# Patient Record
Sex: FEMALE | Race: WHITE | NOT HISPANIC OR LATINO | Employment: UNEMPLOYED | URBAN - METROPOLITAN AREA
[De-identification: names, ages, dates, MRNs, and addresses within clinical notes are randomized per-mention and may not be internally consistent; named-entity substitution may affect disease eponyms.]

---

## 2017-10-18 ENCOUNTER — APPOINTMENT (OUTPATIENT)
Dept: LAB | Facility: HOSPITAL | Age: 47
End: 2017-10-18

## 2017-10-18 ENCOUNTER — OFFICE VISIT (OUTPATIENT)
Dept: NEUROLOGY | Facility: CLINIC | Age: 47
End: 2017-10-18

## 2017-10-18 VITALS
SYSTOLIC BLOOD PRESSURE: 118 MMHG | HEIGHT: 62 IN | WEIGHT: 88 LBS | DIASTOLIC BLOOD PRESSURE: 68 MMHG | BODY MASS INDEX: 16.2 KG/M2

## 2017-10-18 DIAGNOSIS — R56.9 SEIZURES (HCC): Primary | ICD-10-CM

## 2017-10-18 PROCEDURE — 36415 COLL VENOUS BLD VENIPUNCTURE: CPT | Performed by: PSYCHIATRY & NEUROLOGY

## 2017-10-18 PROCEDURE — 80177 DRUG SCRN QUAN LEVETIRACETAM: CPT | Performed by: PSYCHIATRY & NEUROLOGY

## 2017-10-18 PROCEDURE — 99244 OFF/OP CNSLTJ NEW/EST MOD 40: CPT | Performed by: PSYCHIATRY & NEUROLOGY

## 2017-10-18 RX ORDER — LEVETIRACETAM 1000 MG/1
TABLET ORAL
Refills: 0 | COMMUNITY
Start: 2017-10-12 | End: 2018-01-09

## 2017-10-18 RX ORDER — SERTRALINE HYDROCHLORIDE 100 MG/1
TABLET, FILM COATED ORAL
Refills: 0 | COMMUNITY
Start: 2017-10-12 | End: 2022-10-17 | Stop reason: HOSPADM

## 2017-10-18 NOTE — PROGRESS NOTES
Wilman Koch is a 47 y.o. female.     History of Present Illness     The following portions of the patient's history were reviewed today and updated as appropriate:  allergies, current medications, past family history, past medical history, past social history, past surgical history and problem list.      Chief complaint is seizure and the patient is seen today in consultation at the request of the referring health care provider  The time I spent with the patient today was used discussing the differential diagnosis, treatment and management options and prognosis. I addressed all of the patient's questions.  The patient's outside records reviewed by me today and in summary state at that she has a seizure disorder that is treated with Keppra 1000 g twice a day and she continues to have breakthrough seizures with generalized tonic-clonic episodes.  Patient was diagnosed with epilepsy in 2004. Her generalized tonic-clonic seizures were controlled with per 1000 g twice a day until recently when she started having more and she is now having 9 episodes in 3 weeks at that last between 10 and 15 minutes and it takes usually 2 days to get back to her baseline. She and will start passing out with her eyes opened and will stare and really at the person and then will start jerking with all 4 extremities she did not also will make a choking sound and then will come back around to consciousness      Review of Systems   Constitutional: Negative for appetite change, chills and fatigue.   HENT: Negative for congestion, ear pain, facial swelling and sinus pressure.    Eyes: Negative for pain and redness.   Respiratory: Negative for shortness of breath.    Cardiovascular: Negative for chest pain.   Gastrointestinal: Negative for abdominal pain.   Endocrine: Negative for cold intolerance and heat intolerance.   Genitourinary: Negative for dysuria.   Musculoskeletal: Negative for arthralgias.   Skin: Negative for rash.  "  Allergic/Immunologic: Negative for immunocompromised state.   Neurological: Positive for seizures.   Hematological: Negative for adenopathy.   Psychiatric/Behavioral: Negative for hallucinations.         Objective      height is 61.81\" (157 cm) and weight is 88 lb (39.9 kg). Her blood pressure is 118/68.     The patient's general appearance was normal today  Carotid pulses were palpable bilaterally  The ophthalmological exam showed the refractory media clear, no blurring of disc margins, there were no hemorrhages noted, blood vessels appeared normal.      Neurologic Exam     Mental Status   Oriented to person.   Oriented to place. Oriented to country, city, area and street.   Oriented to time. Oriented to year, month, date, day and season.   Registration: recalls 3 of 3 objects. Recall at 5 minutes: recalls 3 of 3 objects. Follows 3 step commands.   Attention: normal.   Speech: speech is normal   Level of consciousness: alert  Knowledge: consistent with education.   Able to name object. Able to read. Able to repeat. Able to write. Normal comprehension.     Cranial Nerves     CN II   Visual fields full to confrontation.     CN III, IV, VI   Right pupil: Shape: regular. Consensual response: intact. Accommodation: intact.   Left pupil: Shape: regular. Consensual response: intact.   CN III: no CN III palsy  CN VI: no CN VI palsy  Nystagmus: none   Diplopia: none  Ophthalmoparesis: none  Upgaze: normal  Downgaze: normal  Conjugate gaze: present  Vestibulo-ocular reflex: present    CN V   Facial sensation intact.     CN VII   Facial expression full, symmetric.     CN VIII   CN VIII normal.     CN IX, X   CN IX normal.     CN XI   CN XI normal.     CN XII   CN XII normal.     Motor Exam   Muscle bulk: normal  Overall muscle tone: normal  Right arm pronator drift: absent  Left arm pronator drift: absent    Strength   Strength 5/5 except as noted.     Sensory Exam   Light touch normal.     Gait, Coordination, and Reflexes " "    Gait  Gait: normal    Coordination   Romberg: negative  Finger to nose coordination: normal  Heel to shin coordination: normal  Tandem walking coordination: normal    Tremor   Resting tremor: absent  Intention tremor: absent  Action tremor: absent    Reflexes   Reflexes 2+ except as noted.       Assessment/Plan     Lucinda was seen today for seizures.    Diagnoses and all orders for this visit:    Seizures  -     Ambulatory Referral to Neurology  -     Levetiracetam Level (Keppra)          Discussion/Summary:      The patient has an unusual presentation of seizures at this point with higher frequency and her eyes open where she seems to look at people during the episodes. Therefore we will schedule her with Dr. Lewis for video EEG evaluation to see if all of these are epileptic episodes and in the meantime she is to increase the dose to 1500 mg twice a day and we will get a blood level today.  She is not allowed to work until she sees Dr. Lewis for evaluation because of the complexity of the situation is beyond the scope of my practice and then she would have to follow up with him. With regards to having by law she is not allowed to drive until 90 days from the last seizure episode. I did not schedule a follow-up appointment              Disclaimer: This letter was created using dragon voice recognition software.  This voice recognition software may create errors that may go undetected and can impact the meaning of a sentence or paragraph.  The most frequent error is that the software misidentifies \"he\" and \"she\". However, contextual reading is often able to identify this as a voice recognotion error.  Another frequent error is that the software misidentifies \"the patient\" as \"\"          "

## 2017-10-20 LAB — LEVETIRACETAM SERPL-MCNC: 57.2 UG/ML (ref 10–40)

## 2018-01-09 ENCOUNTER — OFFICE VISIT (OUTPATIENT)
Dept: NEUROLOGY | Facility: CLINIC | Age: 48
End: 2018-01-09

## 2018-01-09 DIAGNOSIS — R56.9 CONVULSIONS, UNSPECIFIED CONVULSION TYPE (HCC): Primary | ICD-10-CM

## 2018-01-09 PROCEDURE — 99215 OFFICE O/P EST HI 40 MIN: CPT | Performed by: PHYSICIAN ASSISTANT

## 2018-01-09 RX ORDER — HYDROXYZINE PAMOATE 25 MG/1
CAPSULE ORAL
Refills: 0 | COMMUNITY
Start: 2017-12-09 | End: 2022-11-09

## 2018-01-09 NOTE — PROGRESS NOTES
Subjective     Chief Complaint: spells      History of Present Illness   Lucinda Koch is a 47 y.o. female who returns to clinic today for episodes of convulsions. She was previously followed by Dr. Jeronimo. She has noted spells since 2004 during which she 'passes out' with her eyes open and has generalized convulsions. She may have associated loss of consciousness though her family notes that she is typically able to respond to questions during these episodes. These spells typically last 10-15 minutes and may occur several times a week. Afterwards, she notes fatigue and confusion. She denies any associated urinary incontinence and tongue biting. Her family notes that these spells are typically worse with increased stress as well as when she is around a large group of people.     She was previously on Keppra 1500mg BID, which was not beneficial. She had a VEEG performed by Dr. 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 At this time, her Keppra was discontinued. She has not noted in difference in symptoms since the discontinuation of this medication. She was recently started on Zoloft, which has been beneficial.       I have reviewed and confirmed the past family, social and medical history as accurate on 1/9/18.     Review of Systems   Constitutional: Negative.    HENT: Negative.    Eyes: Negative.    Respiratory: Negative.    Cardiovascular: Negative.    Gastrointestinal: Negative.    Endocrine: Negative.    Genitourinary: Negative.    Musculoskeletal: Negative.    Skin: Negative.    Allergic/Immunologic: Negative.    Neurological:        As noted in HPI   Hematological: Negative.    Psychiatric/Behavioral: The patient is nervous/anxious.         As noted in HPI       Objective     There were no vitals taken for this visit.    General appearance today is normal.     Physical Exam   Neurological: She has normal  strength. She has a normal Finger-Nose-Finger Test.   Psychiatric: Her speech is normal.        Neurologic Exam     Mental Status   Speech: speech is normal   Level of consciousness: alert  Normal comprehension.     Cranial Nerves   Cranial nerves II through XII intact.     Motor Exam   Muscle bulk: normal  Overall muscle tone: normal    Strength   Strength 5/5 throughout.     Sensory Exam   Light touch normal.     Gait, Coordination, and Reflexes     Coordination   Finger to nose coordination: normal    Tremor   Resting tremor: absent          Assessment/Plan   Lucinda was seen today for seizures.    Diagnoses and all orders for this visit:    Convulsions, unspecified convulsion type          Discussion/Summary   Lucinda Koch returns to clinic today for convulsive spells. 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 This was discussed at length with the patient and her family. Her workup has been complete and appropriate. Therefore, I do not have any additional recommendations concerning this. I recommended that she follow with psychiatry. She will then follow up in our clinic on an as needed basis.       I spent 30 minutes out of 40 minutes face to face with the patient and family and discussing diagnosis, prognosis, diagnostic testing, evaluation, current status, treatment options and management.    As part of this visit I reviewed radiology results and obtained additional history from the family which is incorporated in the HPI.      Tia Prieto PA-C

## 2022-10-15 ENCOUNTER — HOSPITAL ENCOUNTER (INPATIENT)
Facility: HOSPITAL | Age: 52
LOS: 2 days | Discharge: HOME | End: 2022-10-17
Attending: EMERGENCY MEDICINE | Admitting: HOSPITALIST

## 2022-10-15 ENCOUNTER — APPOINTMENT (OUTPATIENT)
Dept: CT IMAGING | Facility: HOSPITAL | Age: 52
End: 2022-10-15

## 2022-10-15 DIAGNOSIS — R11.10 INTRACTABLE VOMITING: Primary | ICD-10-CM

## 2022-10-15 DIAGNOSIS — E87.6 HYPOKALEMIA: ICD-10-CM

## 2022-10-15 PROBLEM — E43 SEVERE MALNUTRITION (HCC): Status: ACTIVE | Noted: 2022-10-15

## 2022-10-15 LAB
ALBUMIN SERPL-MCNC: 3 G/DL (ref 3.5–5.2)
ALBUMIN/GLOB SERPL: 1.4 G/DL
ALP SERPL-CCNC: 71 U/L (ref 39–117)
ALT SERPL W P-5'-P-CCNC: 36 U/L (ref 1–33)
AMORPH URATE CRY URNS QL MICRO: ABNORMAL /HPF
ANION GAP SERPL CALCULATED.3IONS-SCNC: 12 MMOL/L (ref 5–15)
AST SERPL-CCNC: 49 U/L (ref 1–32)
BACTERIA UR QL AUTO: ABNORMAL /HPF
BASOPHILS # BLD AUTO: 0.01 10*3/MM3 (ref 0–0.2)
BASOPHILS NFR BLD AUTO: 0.1 % (ref 0–1.5)
BILIRUB SERPL-MCNC: 0.5 MG/DL (ref 0–1.2)
BILIRUB UR QL STRIP: NEGATIVE
BUN SERPL-MCNC: 6 MG/DL (ref 6–20)
BUN/CREAT SERPL: 11.3 (ref 7–25)
CALCIUM SPEC-SCNC: 8.7 MG/DL (ref 8.6–10.5)
CHLORIDE SERPL-SCNC: 91 MMOL/L (ref 98–107)
CLARITY UR: ABNORMAL
CO2 SERPL-SCNC: 30 MMOL/L (ref 22–29)
COD CRY URNS QL: ABNORMAL /HPF
COLOR UR: YELLOW
CREAT SERPL-MCNC: 0.53 MG/DL (ref 0.57–1)
D-LACTATE SERPL-SCNC: 1.8 MMOL/L (ref 0.5–2)
DEPRECATED RDW RBC AUTO: 46 FL (ref 37–54)
EGFRCR SERPLBLD CKD-EPI 2021: 112.1 ML/MIN/1.73
EOSINOPHIL # BLD AUTO: 0.02 10*3/MM3 (ref 0–0.4)
EOSINOPHIL NFR BLD AUTO: 0.3 % (ref 0.3–6.2)
ERYTHROCYTE [DISTWIDTH] IN BLOOD BY AUTOMATED COUNT: 13.5 % (ref 12.3–15.4)
FOLATE SERPL-MCNC: 16.6 NG/ML (ref 4.78–24.2)
GLOBULIN UR ELPH-MCNC: 2.1 GM/DL
GLUCOSE SERPL-MCNC: 104 MG/DL (ref 65–99)
GLUCOSE UR STRIP-MCNC: NEGATIVE MG/DL
GRAN CASTS URNS QL MICRO: ABNORMAL /LPF
HCT VFR BLD AUTO: 40 % (ref 34–46.6)
HGB BLD-MCNC: 14.2 G/DL (ref 12–15.9)
HGB UR QL STRIP.AUTO: NEGATIVE
HOLD SPECIMEN: NORMAL
HOLD SPECIMEN: NORMAL
HYALINE CASTS UR QL AUTO: ABNORMAL /LPF
IMM GRANULOCYTES # BLD AUTO: 0.04 10*3/MM3 (ref 0–0.05)
IMM GRANULOCYTES NFR BLD AUTO: 0.5 % (ref 0–0.5)
KETONES UR QL STRIP: ABNORMAL
LEUKOCYTE ESTERASE UR QL STRIP.AUTO: NEGATIVE
LIPASE SERPL-CCNC: 14 U/L (ref 13–60)
LYMPHOCYTES # BLD AUTO: 1.51 10*3/MM3 (ref 0.7–3.1)
LYMPHOCYTES NFR BLD AUTO: 19.8 % (ref 19.6–45.3)
MAGNESIUM SERPL-MCNC: 1.3 MG/DL (ref 1.6–2.6)
MCH RBC QN AUTO: 32.7 PG (ref 26.6–33)
MCHC RBC AUTO-ENTMCNC: 35.5 G/DL (ref 31.5–35.7)
MCV RBC AUTO: 92.2 FL (ref 79–97)
MONOCYTES # BLD AUTO: 0.57 10*3/MM3 (ref 0.1–0.9)
MONOCYTES NFR BLD AUTO: 7.5 % (ref 5–12)
MUCOUS THREADS URNS QL MICRO: ABNORMAL /HPF
NEUTROPHILS NFR BLD AUTO: 5.49 10*3/MM3 (ref 1.7–7)
NEUTROPHILS NFR BLD AUTO: 71.8 % (ref 42.7–76)
NITRITE UR QL STRIP: NEGATIVE
NRBC BLD AUTO-RTO: 0 /100 WBC (ref 0–0.2)
PH UR STRIP.AUTO: 6.5 [PH] (ref 5–8)
PLATELET # BLD AUTO: 273 10*3/MM3 (ref 140–450)
PMV BLD AUTO: 9.6 FL (ref 6–12)
POTASSIUM SERPL-SCNC: 2.5 MMOL/L (ref 3.5–5.2)
PROT SERPL-MCNC: 5.1 G/DL (ref 6–8.5)
PROT UR QL STRIP: NEGATIVE
RBC # BLD AUTO: 4.34 10*6/MM3 (ref 3.77–5.28)
RBC # UR STRIP: ABNORMAL /HPF
REF LAB TEST METHOD: ABNORMAL
SODIUM SERPL-SCNC: 133 MMOL/L (ref 136–145)
SP GR UR STRIP: 1.01 (ref 1–1.03)
SQUAMOUS #/AREA URNS HPF: ABNORMAL /HPF
TSH SERPL DL<=0.05 MIU/L-ACNC: 1.57 UIU/ML (ref 0.27–4.2)
UROBILINOGEN UR QL STRIP: ABNORMAL
VIT B12 BLD-MCNC: 335 PG/ML (ref 211–946)
WBC # UR STRIP: ABNORMAL /HPF
WBC NRBC COR # BLD: 7.64 10*3/MM3 (ref 3.4–10.8)
WHOLE BLOOD HOLD COAG: NORMAL
WHOLE BLOOD HOLD SPECIMEN: NORMAL

## 2022-10-15 PROCEDURE — 81001 URINALYSIS AUTO W/SCOPE: CPT | Performed by: EMERGENCY MEDICINE

## 2022-10-15 PROCEDURE — 82607 VITAMIN B-12: CPT | Performed by: HOSPITALIST

## 2022-10-15 PROCEDURE — 25010000002 MAGNESIUM SULFATE 2 GM/50ML SOLUTION: Performed by: HOSPITALIST

## 2022-10-15 PROCEDURE — 0 POTASSIUM CHLORIDE 10 MEQ/100ML SOLUTION: Performed by: HOSPITALIST

## 2022-10-15 PROCEDURE — 74177 CT ABD & PELVIS W/CONTRAST: CPT

## 2022-10-15 PROCEDURE — 99223 1ST HOSP IP/OBS HIGH 75: CPT | Performed by: HOSPITALIST

## 2022-10-15 PROCEDURE — 83605 ASSAY OF LACTIC ACID: CPT | Performed by: EMERGENCY MEDICINE

## 2022-10-15 PROCEDURE — 99284 EMERGENCY DEPT VISIT MOD MDM: CPT

## 2022-10-15 PROCEDURE — 83690 ASSAY OF LIPASE: CPT | Performed by: EMERGENCY MEDICINE

## 2022-10-15 PROCEDURE — 25010000002 IOPAMIDOL 61 % SOLUTION: Performed by: EMERGENCY MEDICINE

## 2022-10-15 PROCEDURE — 82746 ASSAY OF FOLIC ACID SERUM: CPT | Performed by: HOSPITALIST

## 2022-10-15 PROCEDURE — 99222 1ST HOSP IP/OBS MODERATE 55: CPT | Performed by: INTERNAL MEDICINE

## 2022-10-15 PROCEDURE — 25010000002 ONDANSETRON PER 1 MG: Performed by: NURSE PRACTITIONER

## 2022-10-15 PROCEDURE — 83735 ASSAY OF MAGNESIUM: CPT | Performed by: NURSE PRACTITIONER

## 2022-10-15 PROCEDURE — 80050 GENERAL HEALTH PANEL: CPT | Performed by: EMERGENCY MEDICINE

## 2022-10-15 PROCEDURE — 36415 COLL VENOUS BLD VENIPUNCTURE: CPT

## 2022-10-15 RX ORDER — ONDANSETRON 2 MG/ML
4 INJECTION INTRAMUSCULAR; INTRAVENOUS EVERY 6 HOURS PRN
Status: DISCONTINUED | OUTPATIENT
Start: 2022-10-15 | End: 2022-10-17 | Stop reason: HOSPADM

## 2022-10-15 RX ORDER — MAGNESIUM SULFATE HEPTAHYDRATE 40 MG/ML
4 INJECTION, SOLUTION INTRAVENOUS AS NEEDED
Status: DISCONTINUED | OUTPATIENT
Start: 2022-10-15 | End: 2022-10-17 | Stop reason: HOSPADM

## 2022-10-15 RX ORDER — MAGNESIUM SULFATE HEPTAHYDRATE 40 MG/ML
2 INJECTION, SOLUTION INTRAVENOUS AS NEEDED
Status: DISCONTINUED | OUTPATIENT
Start: 2022-10-15 | End: 2022-10-17 | Stop reason: HOSPADM

## 2022-10-15 RX ORDER — PROCHLORPERAZINE EDISYLATE 5 MG/ML
5 INJECTION INTRAMUSCULAR; INTRAVENOUS EVERY 6 HOURS PRN
Status: DISCONTINUED | OUTPATIENT
Start: 2022-10-15 | End: 2022-10-17 | Stop reason: HOSPADM

## 2022-10-15 RX ORDER — PROCHLORPERAZINE MALEATE 5 MG/1
5 TABLET ORAL EVERY 6 HOURS PRN
Status: DISCONTINUED | OUTPATIENT
Start: 2022-10-15 | End: 2022-10-17 | Stop reason: HOSPADM

## 2022-10-15 RX ORDER — DEXTROSE, SODIUM CHLORIDE, AND POTASSIUM CHLORIDE 5; .9; .15 G/100ML; G/100ML; G/100ML
75 INJECTION INTRAVENOUS CONTINUOUS
Status: DISCONTINUED | OUTPATIENT
Start: 2022-10-15 | End: 2022-10-17 | Stop reason: HOSPADM

## 2022-10-15 RX ORDER — DIPHENOXYLATE HYDROCHLORIDE AND ATROPINE SULFATE 2.5; .025 MG/1; MG/1
1 TABLET ORAL DAILY
Status: DISCONTINUED | OUTPATIENT
Start: 2022-10-15 | End: 2022-10-17 | Stop reason: HOSPADM

## 2022-10-15 RX ORDER — PANTOPRAZOLE SODIUM 40 MG/1
40 TABLET, DELAYED RELEASE ORAL
Status: DISCONTINUED | OUTPATIENT
Start: 2022-10-16 | End: 2022-10-17 | Stop reason: HOSPADM

## 2022-10-15 RX ORDER — POTASSIUM CHLORIDE 1.5 G/1.77G
40 POWDER, FOR SOLUTION ORAL AS NEEDED
Status: DISCONTINUED | OUTPATIENT
Start: 2022-10-15 | End: 2022-10-17 | Stop reason: HOSPADM

## 2022-10-15 RX ORDER — SODIUM CHLORIDE 9 MG/ML
10 INJECTION INTRAVENOUS AS NEEDED
Status: DISCONTINUED | OUTPATIENT
Start: 2022-10-15 | End: 2022-10-17 | Stop reason: HOSPADM

## 2022-10-15 RX ORDER — ONDANSETRON 2 MG/ML
4 INJECTION INTRAMUSCULAR; INTRAVENOUS ONCE
Status: COMPLETED | OUTPATIENT
Start: 2022-10-15 | End: 2022-10-15

## 2022-10-15 RX ORDER — POTASSIUM CHLORIDE 7.45 MG/ML
10 INJECTION INTRAVENOUS
Status: DISCONTINUED | OUTPATIENT
Start: 2022-10-15 | End: 2022-10-17 | Stop reason: HOSPADM

## 2022-10-15 RX ORDER — SODIUM CHLORIDE 0.9 % (FLUSH) 0.9 %
10 SYRINGE (ML) INJECTION AS NEEDED
Status: DISCONTINUED | OUTPATIENT
Start: 2022-10-15 | End: 2022-10-17 | Stop reason: HOSPADM

## 2022-10-15 RX ORDER — TOPIRAMATE 100 MG/1
100 TABLET, FILM COATED ORAL 2 TIMES DAILY
Status: DISCONTINUED | OUTPATIENT
Start: 2022-10-15 | End: 2022-10-17 | Stop reason: HOSPADM

## 2022-10-15 RX ORDER — PROCHLORPERAZINE 25 MG
25 SUPPOSITORY, RECTAL RECTAL EVERY 12 HOURS PRN
Status: DISCONTINUED | OUTPATIENT
Start: 2022-10-15 | End: 2022-10-17 | Stop reason: HOSPADM

## 2022-10-15 RX ORDER — POTASSIUM CHLORIDE 750 MG/1
40 CAPSULE, EXTENDED RELEASE ORAL AS NEEDED
Status: DISCONTINUED | OUTPATIENT
Start: 2022-10-15 | End: 2022-10-17 | Stop reason: HOSPADM

## 2022-10-15 RX ORDER — TOPIRAMATE 100 MG/1
100 TABLET, FILM COATED ORAL 2 TIMES DAILY
COMMUNITY
End: 2022-11-09

## 2022-10-15 RX ORDER — FLUOXETINE 10 MG/1
10 CAPSULE ORAL DAILY
Status: DISCONTINUED | OUTPATIENT
Start: 2022-10-15 | End: 2022-10-17 | Stop reason: HOSPADM

## 2022-10-15 RX ORDER — FLUOXETINE 10 MG/1
10 CAPSULE ORAL DAILY
COMMUNITY

## 2022-10-15 RX ORDER — LEVETIRACETAM 750 MG/1
750 TABLET ORAL 2 TIMES DAILY
COMMUNITY

## 2022-10-15 RX ORDER — SODIUM CHLORIDE 0.9 % (FLUSH) 0.9 %
10 SYRINGE (ML) INJECTION EVERY 12 HOURS SCHEDULED
Status: DISCONTINUED | OUTPATIENT
Start: 2022-10-15 | End: 2022-10-17 | Stop reason: HOSPADM

## 2022-10-15 RX ADMIN — LEVETIRACETAM 750 MG: 250 TABLET, FILM COATED ORAL at 20:22

## 2022-10-15 RX ADMIN — POTASSIUM CHLORIDE 10 MEQ: 7.46 INJECTION, SOLUTION INTRAVENOUS at 15:09

## 2022-10-15 RX ADMIN — Medication 10 ML: at 20:23

## 2022-10-15 RX ADMIN — POTASSIUM CHLORIDE 10 MEQ: 7.46 INJECTION, SOLUTION INTRAVENOUS at 17:39

## 2022-10-15 RX ADMIN — MULTIVITAMIN TABLET 1 TABLET: TABLET at 15:09

## 2022-10-15 RX ADMIN — ONDANSETRON 4 MG: 2 INJECTION INTRAMUSCULAR; INTRAVENOUS at 10:10

## 2022-10-15 RX ADMIN — IOPAMIDOL 100 ML: 612 INJECTION, SOLUTION INTRAVENOUS at 10:25

## 2022-10-15 RX ADMIN — MAGNESIUM SULFATE HEPTAHYDRATE 2 G: 2 INJECTION, SOLUTION INTRAVENOUS at 15:17

## 2022-10-15 RX ADMIN — MAGNESIUM SULFATE HEPTAHYDRATE 2 G: 2 INJECTION, SOLUTION INTRAVENOUS at 17:26

## 2022-10-15 RX ADMIN — SODIUM CHLORIDE 1000 ML: 9 INJECTION, SOLUTION INTRAVENOUS at 10:10

## 2022-10-15 RX ADMIN — POTASSIUM CHLORIDE 10 MEQ: 7.46 INJECTION, SOLUTION INTRAVENOUS at 13:50

## 2022-10-15 RX ADMIN — POTASSIUM CHLORIDE 10 MEQ: 7.46 INJECTION, SOLUTION INTRAVENOUS at 16:35

## 2022-10-15 RX ADMIN — DEXTROSE MONOHYDRATE, SODIUM CHLORIDE, AND POTASSIUM CHLORIDE 75 ML/HR: 50; 9; 1.49 INJECTION, SOLUTION INTRAVENOUS at 15:14

## 2022-10-15 RX ADMIN — POTASSIUM CHLORIDE 10 MEQ: 7.46 INJECTION, SOLUTION INTRAVENOUS at 20:22

## 2022-10-15 RX ADMIN — MAGNESIUM SULFATE HEPTAHYDRATE 2 G: 2 INJECTION, SOLUTION INTRAVENOUS at 13:51

## 2022-10-15 RX ADMIN — POTASSIUM CHLORIDE 10 MEQ: 7.46 INJECTION, SOLUTION INTRAVENOUS at 18:44

## 2022-10-15 NOTE — CONSULTS
[facility]ology Consult    Referring Provider: Kevin Carroll MD    PCP: Jese Tran MD    Reason for Consultation: Intractable N/V    Chief complaint: nausea and vomiting.    History of present illness:    Lucinda Koch is a 52 y.o. female who is admitted with 6-month history of nausea and vomiting.  There is associated 40 pound weight loss.  Patient typically vomits after meals, but can vomit when not eating as well.  Denies hematemesis.   She was recently admitted to [facility] without finding an etiology for her symptoms.  Specifically, she had a negative work-up for biliary disease.  Reglan made her symptoms worse.  Zofran made her symptoms worse.  She is scheduled for an open access colonoscopy in [place] on October 20, 2022.  In the emergency room, CT scan of the abdomen is unremarkable.  She has mild transaminase elevation and multiple electrolyte abnormalities.  Denies history of anorexia/bulimia.  She does frequently take ibuprofen.    Allergies:  Aspirin    Scheduled Meds:  FLUoxetine, 10 mg, Oral, Daily  levETIRAcetam, 750 mg, Oral, BID  multivitamin, 1 tablet, Oral, Daily  [START ON 10/16/2022] pantoprazole, 40 mg, Oral, Q AM  sodium chloride, 10 mL, Intravenous, Q12H  topiramate, 100 mg, Oral, BID         Infusions:  dextrose 5 % and sodium chloride 0.9 % with KCl 20 mEq, 75 mL/hr, Last Rate: 75 mL/hr (10/15/22 1514)        PRN Meds:  •  magnesium sulfate **OR** magnesium sulfate **OR** magnesium sulfate  •  ondansetron  •  potassium chloride **OR** potassium chloride **OR** potassium chloride  •  prochlorperazine **OR** prochlorperazine **OR** prochlorperazine  •  Sodium Chloride (PF)  •  sodium chloride    Home Meds:  Medications Prior to Admission   Medication Sig Dispense Refill Last Dose   • FLUoxetine (PROzac) 10 MG capsule Take 1 capsule by mouth Daily.      • hydrOXYzine (VISTARIL) 25 MG capsule take 1 capsule by mouth every 6 hours if needed for anxiety  0     • levETIRAcetam (KEPPRA) 750 MG tablet Take 1 tablet by mouth 2 (Two) Times a Day.      • sertraline (ZOLOFT) 100 MG tablet take 1 tablet by mouth once daily  0    • topiramate (TOPAMAX) 100 MG tablet Take 1 tablet by mouth 2 (Two) Times a Day.          ROS: Review of Systems   Constitutional: Positive for appetite change and unexpected weight change. Negative for activity change, chills, fatigue and fever.   HENT: Negative.  Negative for mouth sores, nosebleeds and trouble swallowing.    Eyes: Negative.    Respiratory: Negative.  Negative for cough, chest tightness, shortness of breath, wheezing and stridor.    Cardiovascular: Negative.  Negative for chest pain and leg swelling.   Gastrointestinal: Positive for constipation, nausea and vomiting. Negative for abdominal distention, abdominal pain, blood in stool and diarrhea.   Endocrine: Negative.    Genitourinary: Negative.  Negative for difficulty urinating and dyspareunia.   Musculoskeletal: Negative.    Skin: Negative.    Allergic/Immunologic: Negative.    Neurological: Positive for seizures. Negative for tremors, syncope, weakness and light-headedness.   Hematological: Negative.  Negative for adenopathy. Does not bruise/bleed easily.   Psychiatric/Behavioral: Negative.  Negative for agitation and behavioral problems.       PAST MED HX:  Past Medical History:   Diagnosis Date   • Depression    • Migraine    • Seizures (HCC)        PAST SURG HX:  History reviewed. No pertinent surgical history.    FAM HX:  Family History   Problem Relation Age of Onset   • Heart disease Mother    • Hypertension Mother    • Heart disease Father    • Hypertension Father        SOC HX:  Social History     Socioeconomic History   • Marital status: Single   Tobacco Use   • Smoking status: Every Day     Packs/day: 2.00     Types: Cigarettes   • Smokeless tobacco: Never   Substance and Sexual Activity   • Alcohol use: No   • Drug use: Defer   • Sexual activity: Defer       PHYSICAL  "EXAM  /94 (BP Location: Left arm, Patient Position: Lying)   Pulse 93   Temp 98.9 °F (37.2 °C) (Oral)   Resp 16   Ht 157.5 cm (62\")   Wt 36.3 kg (80 lb)   SpO2 97%   BMI 14.63 kg/m²   Wt Readings from Last 3 Encounters:   10/15/22 36.3 kg (80 lb)   10/18/17 39.9 kg (88 lb)   ,body mass index is 14.63 kg/m².  Physical Exam  Vitals and nursing note reviewed.   Constitutional:       General: She is not in acute distress.     Appearance: She is ill-appearing. She is not toxic-appearing or diaphoretic.      Comments: Appears chronically ill and underweight.   HENT:      Head: Normocephalic.      Nose: Nose normal. No congestion.      Mouth/Throat:      Mouth: Mucous membranes are moist.      Pharynx: No oropharyngeal exudate.   Eyes:      General: No scleral icterus.     Conjunctiva/sclera: Conjunctivae normal.   Cardiovascular:      Rate and Rhythm: Normal rate.      Pulses: Normal pulses.   Pulmonary:      Effort: Pulmonary effort is normal. No respiratory distress.      Breath sounds: No stridor. No wheezing or rales.   Abdominal:      General: Bowel sounds are normal. There is no distension.      Palpations: Abdomen is soft.      Tenderness: There is no abdominal tenderness. There is no guarding.   Genitourinary:     Comments: Deferred  Musculoskeletal:      Cervical back: Normal range of motion.      Right lower leg: No edema.      Left lower leg: No edema.   Skin:     General: Skin is warm and dry.      Capillary Refill: Capillary refill takes less than 2 seconds.      Coloration: Skin is not jaundiced or pale.      Findings: No erythema or rash.   Neurological:      General: No focal deficit present.      Mental Status: She is alert and oriented to person, place, and time.   Psychiatric:         Mood and Affect: Mood normal.         Behavior: Behavior normal.       Results Review:   I reviewed the patient's new clinical results.    Lab Results   Component Value Date    WBC 7.64 10/15/2022    HGB 14.2 " 10/15/2022    HGB 13.9 01/07/2020    HCT 40.0 10/15/2022    MCV 92.2 10/15/2022     10/15/2022       No results found for: INR    Lab Results   Component Value Date    GLUCOSE 104 (H) 10/15/2022    BUN 6 10/15/2022    CREATININE 0.53 (L) 10/15/2022    BCR 11.3 10/15/2022     (L) 10/15/2022    K 2.5 (C) 10/15/2022    CO2 30.0 (H) 10/15/2022    CALCIUM 8.7 10/15/2022    ALBUMIN 3.00 (L) 10/15/2022    ALKPHOS 71 10/15/2022    BILITOT 0.5 10/15/2022    ALT 36 (H) 10/15/2022    AST 49 (H) 10/15/2022       ASSESSMENTS/PLANS    1.  Chronic severe nausea and vomiting.  At risk for SMA syndrome.  At risk for peptic ulcer disease (NSAID use).  2.  Hyponatremic, hypokalemic, metabolic alkalosis consistent with significant vomiting.  3.  Weight loss.  4.  Severe protein calorie malnutrition and underweight.  5.  Borderline elevated transaminases and focal fatty liver on CT.  Suspect related to severe malnutrition.  6.  Seizure disorder.  Patient reports good control of seizures on [facility] since 2004.    >> Plan EGD when electrolytes corrected, tentatively on Monday, 10/17/2022.  >> At risk for refeeding syndrome.  >> Recommend discontinue Topamax and find an alternative for migraine prophylaxis if needed, as Topamax is associated with nausea and weight loss.    I discussed the patient's findings and my recommendations with patient.    Mark I. Brunner, MD  10/15/22  16:00 EDT

## 2022-10-15 NOTE — H&P
[facility] Medicine Services  HISTORY AND PHYSICAL    Patient Name: Lucinda Koch  : 1970  MRN: [id number]  Primary Care Physician: Jese Tran MD  Date of admission: 10/15/2022      Subjective   Subjective     Chief Complaint:  Nausea and vomiting    HPI:  Lucinda Koch is a 52 y.o. female here with nausea/vomiting/weight loss x 6 months. Six months ago she noted progressive nausea with near daily vomiting, sometimes 2-3x daily. Sometimes associated with food but not always. Notes abdominal cramping with emesis otherwise no pain. Denies diarrhea. Denies blood in stool or hematemesis. Occasionally describes bilious vomiting. No f/c/sweats. Notes occasional globus sensation when swallowing, but otherwise no difficulty. Notes 40 pound weight loss. Has been seen in multiple EDs without diagnosis. Has tired zofran and reglan and these exacerbated her symptoms. She reports being told that her gallbladder was normal       Review of Systems   Constitutional: Positive for activity change, appetite change and fatigue.   HENT: Negative.    Respiratory: Negative.    Cardiovascular: Negative.    Gastrointestinal: Positive for nausea and vomiting.        Heartburn/reflux   Endocrine: Negative.    Genitourinary: Negative.    Musculoskeletal: Negative.    Skin: Positive for rash.   Neurological: Positive for weakness and numbness.        All other systems reviewed and are negative.     Personal History     Past Medical History:   Diagnosis Date   • Depression    • Migraine    • Seizures (HCC)              History reviewed. No pertinent surgical history.    Family History: Her family history includes Heart disease in her father and mother; Hypertension in her father and mother. Otherwise pertinent FHx was reviewed and unremarkable.     Social History:  reports that she has been smoking cigarettes. She has been smoking an average of 2 packs per day. She has never used smokeless  tobacco. Drug use questions deferred to the physician. She reports that she does not drink alcohol.  Social History     Social History Narrative   • Not on file       Medications:  Available home medication information reviewed.  (Not in a hospital admission)      Allergies   Allergen Reactions   • Aspirin        Objective   Objective     Vital Signs:   Temp:  [98.4 °F (36.9 °C)] 98.4 °F (36.9 °C)  Heart Rate:  [] 92  Resp:  [16] 16  BP: (126-140)/() 140/96       Physical Exam   NAD, alert and oriented  OP clear, MMM  Neck supple  No LAD  RRR  CTAB  +BS, ND, NT, soft  HARRELL  Normal affect  No c/c/e    Result Review:  I have personally reviewed the results from the time of this admission to 10/15/2022 12:58 EDT and agree with these findings:  []  Laboratory list / accordion  []  Microbiology  []  Radiology  []  EKG/Telemetry   []  Cardiology/Vascular   []  Pathology  []  Old records  []  Other:  Most notable findings include: CT reviewed, low K, low protein        LAB RESULTS:      Lab 10/15/22  1000   WBC 7.64   HEMOGLOBIN 14.2   HEMATOCRIT 40.0   PLATELETS 273   NEUTROS ABS 5.49   IMMATURE GRANS (ABS) 0.04   LYMPHS ABS 1.51   MONOS ABS 0.57   EOS ABS 0.02   MCV 92.2   LACTATE 1.8         Lab 10/15/22  1038   SODIUM 133*   POTASSIUM 2.5*   CHLORIDE 91*   CO2 30.0*   ANION GAP 12.0   BUN 6   CREATININE 0.53*   EGFR 112.1   GLUCOSE 104*   CALCIUM 8.7   MAGNESIUM 1.3*         Lab 10/15/22  1038   TOTAL PROTEIN 5.1*   ALBUMIN 3.00*   GLOBULIN 2.1   ALT (SGPT) 36*   AST (SGOT) 49*   BILIRUBIN 0.5   ALK PHOS 71   LIPASE 14                     UA    Urinalysis 10/15/22 10/15/22    1000 1000   Squamous Epithelial Cells, UA  3-6 (A)   Specific Gravity, UA 1.014    Ketones, UA 15 mg/dL (1+) (A)    Blood, UA Negative    Leukocytes, UA Negative    Nitrite, UA Negative    RBC, UA  0-2   WBC, UA  6-12 (A)   Bacteria, UA  None Seen   (A) Abnormal value              Microbiology Results (last 10 days)     ** No results  found for the last 240 hours. **          CT Abdomen Pelvis With Contrast    Result Date: 10/15/2022  DATE OF EXAM: 10/15/2022 10:17 AM  PROCEDURE: CT ABDOMEN PELVIS W CONTRAST-  INDICATIONS: cyclical vomiting  COMPARISON: No comparisons available.  TECHNIQUE: Routine transaxial slices were obtained through the abdomen and pelvis after the intravenous administration of 100 mL of Isovue 300. Reconstructed coronal and sagittal images were also obtained. Automated exposure control and iterative construction methods were used.  The radiation dose reduction device was turned on for each scan per the ALARA (As Low as Reasonably Achievable) protocol.  FINDINGS: The lung bases are clear.  There is focal steatosis within the left hepatic lobe near the falciform ligament. The gallbladder, adrenal glands, right kidney, spleen, and pancreas are unremarkable. There is left renal cortical scarring.  The stomach appears normal. The small bowel appears normal in caliber and configuration. The colon appears normal. The appendix is not well-visualized. There is no ascites or loculated collection. No abnormally enlarged lymph nodes are identified.  The rectum and urinary bladder are unremarkable. The uterus is surgically absent.  No aggressive osseous lesions are identified.      Impression: No acute process identified within abdomen/pelvis.  This report was finalized on 10/15/2022 10:59 AM by Donny Pozo MD.            Assessment & Plan   Assessment & Plan     Active Hospital Problems    Diagnosis  POA   • **Intractable vomiting [R11.10]  Yes   • Severe malnutrition (HCC) [E43]  Unknown     Intractable N/V  FTT  Severe protein malnutrition  Neuropathy  -IVF  -replace electrolytes prn  -check B12/folate  -check TSH  -PPI  -prn nausea meds  -consult GI    Hx of Migraines on topamax    Hx of seizures on Keppra    Hx of anxiety on prozac    DVT prophylaxis:  SCDS      CODE STATUS:  Full/CPR  Code Status and Medical Interventions:    Ordered at: 10/15/22 1221     Code Status (Patient has no pulse and is not breathing):    CPR (Attempt to Resuscitate)     Medical Interventions (Patient has pulse or is breathing):    Full Support         Kevin Carroll MD  10/15/22

## 2022-10-15 NOTE — ED PROVIDER NOTES
" EMERGENCY DEPARTMENT ENCOUNTER    Pt Name: Lucinda Koch  MRN: [id number]  Pt :   1970  Room Number:  S218/1  Date of encounter:  10/15/2022  PCP: Jese Tran MD  ED Provider: TAYLOR Mayberry    Historian: Patient and       HPI:  Chief Complaint: Cyclical vomiting        Context: Lucinda Koch is a 52 y.o. female who presents to the ED c/o daily vomiting with postprandial vomiting with 100% of her attempts to consume food or fluids for approximately 3 months.  Symptoms began 6 months ago.  She has had 2 prior ER visits elsewhere in the [facility].  She has seen her primary care provider who is referred her to GI.  A colonoscopy has been scheduled for her  by a gastroenterologist in [place] whom she has not yet met.  Patient feels dehydrated.  She is experiencing weakness.  States she has lost approximately 40 pounds in 6 months.    Review of her medications reveals she has been taking Keppra, fluoxetine and Topamax for approximately 8 years.  No recent medication alterations.    Review of systems is negative for fever or chills.  Positive for fatigue and orthostatic dizziness.  Negative for chest pain or cough or shortness of breath.  Positive for nausea and vomiting as aforementioned.  Positive for abdominal aching in the upper gastric region as well as \"heartburn\".  Negative for constipation or diarrhea.  Patient thinks she may have vomited some blood last week but it was a small amount and subsided spontaneously.   systems are negative.  Negative for syncope.  No neurosensory complaint or focal weakness.  No rash no color changes      PAST MEDICAL HISTORY  Past Medical History:   Diagnosis Date   • Depression    • Migraine    • Seizures (HCC)          PAST SURGICAL HISTORY  History reviewed. No pertinent surgical history.      FAMILY HISTORY  Family History   Problem Relation Age of Onset   • Heart disease Mother    • Hypertension Mother    • Heart disease " Father    • Hypertension Father          SOCIAL HISTORY  Social History     Socioeconomic History   • Marital status: Single   Tobacco Use   • Smoking status: Every Day     Packs/day: 2.00     Types: Cigarettes   • Smokeless tobacco: Never   Substance and Sexual Activity   • Alcohol use: No   • Drug use: Defer   • Sexual activity: Defer         ALLERGIES  Aspirin        REVIEW OF SYSTEMS  Review of Systems     All systems reviewed and negative except for those discussed in HPI.       PHYSICAL EXAM    I have reviewed the triage vital signs and nursing notes.    ED Triage Vitals [10/15/22 0839]   Temp Heart Rate Resp BP SpO2   98.4 °F (36.9 °C) (!) 126 16 (!) 126/102 100 %      Temp src Heart Rate Source Patient Position BP Location FiO2 (%)   Oral Monitor Sitting Left arm --       Physical Exam  GENERAL:   Appears frail ill-appearing female with a flat affect.  She is tachycardic.  She appears under nourished  HENT: Nares patent.  EYES: No scleral icterus.  CV: Regular rhythm, tachycardic.  No peripheral edema  RESPIRATORY: Normal effort.  No audible wheezes, rales or rhonchi.  ABDOMEN: Soft, nontender  MUSCULOSKELETAL: No deformities.   NEURO: Alert, moves all extremities, follows commands.  No neurosensory deficit or focal weakness  SKIN: Warm, dry, no rash visualized.        LAB RESULTS  Recent Results (from the past 24 hour(s))   Urinalysis With Microscopic If Indicated (No Culture) - Urine, Clean Catch    Collection Time: 10/15/22 10:00 AM    Specimen: Urine, Clean Catch   Result Value Ref Range    Color, UA Yellow Yellow, Straw    Appearance, UA Cloudy (A) Clear    pH, UA 6.5 5.0 - 8.0    Specific Gravity, UA 1.014 1.001 - 1.030    Glucose, UA Negative Negative    Ketones, UA 15 mg/dL (1+) (A) Negative    Bilirubin, UA Negative Negative    Blood, UA Negative Negative    Protein, UA Negative Negative    Leuk Esterase, UA Negative Negative    Nitrite, UA Negative Negative    Urobilinogen, UA 1.0 E.U./dL 0.2 - 1.0  E.U./dL   Lactic Acid, Plasma    Collection Time: 10/15/22 10:00 AM    Specimen: Blood   Result Value Ref Range    Lactate 1.8 0.5 - 2.0 mmol/L   Lavender Top    Collection Time: 10/15/22 10:00 AM   Result Value Ref Range    Extra Tube hold for add-on    Gray Top    Collection Time: 10/15/22 10:00 AM   Result Value Ref Range    Extra Tube Hold for add-ons.    Light Blue Top    Collection Time: 10/15/22 10:00 AM   Result Value Ref Range    Extra Tube Hold for add-ons.    CBC Auto Differential    Collection Time: 10/15/22 10:00 AM    Specimen: Blood   Result Value Ref Range    WBC 7.64 3.40 - 10.80 10*3/mm3    RBC 4.34 3.77 - 5.28 10*6/mm3    Hemoglobin 14.2 12.0 - 15.9 g/dL    Hematocrit 40.0 34.0 - 46.6 %    MCV 92.2 79.0 - 97.0 fL    MCH 32.7 26.6 - 33.0 pg    MCHC 35.5 31.5 - 35.7 g/dL    RDW 13.5 12.3 - 15.4 %    RDW-SD 46.0 37.0 - 54.0 fl    MPV 9.6 6.0 - 12.0 fL    Platelets 273 140 - 450 10*3/mm3    Neutrophil % 71.8 42.7 - 76.0 %    Lymphocyte % 19.8 19.6 - 45.3 %    Monocyte % 7.5 5.0 - 12.0 %    Eosinophil % 0.3 0.3 - 6.2 %    Basophil % 0.1 0.0 - 1.5 %    Immature Grans % 0.5 0.0 - 0.5 %    Neutrophils, Absolute 5.49 1.70 - 7.00 10*3/mm3    Lymphocytes, Absolute 1.51 0.70 - 3.10 10*3/mm3    Monocytes, Absolute 0.57 0.10 - 0.90 10*3/mm3    Eosinophils, Absolute 0.02 0.00 - 0.40 10*3/mm3    Basophils, Absolute 0.01 0.00 - 0.20 10*3/mm3    Immature Grans, Absolute 0.04 0.00 - 0.05 10*3/mm3    nRBC 0.0 0.0 - 0.2 /100 WBC   Urinalysis, Microscopic Only - Urine, Clean Catch    Collection Time: 10/15/22 10:00 AM    Specimen: Urine, Clean Catch   Result Value Ref Range    RBC, UA 0-2 None Seen, 0-2 /HPF    WBC, UA 6-12 (A) None Seen, 0-2 /HPF    Bacteria, UA None Seen None Seen, Trace /HPF    Squamous Epithelial Cells, UA 3-6 (A) None Seen, 0-2 /HPF    Hyaline Casts, UA 21-30 0 - 6 /LPF    Granular Casts, UA 7-12 None Seen /LPF    Calcium Oxalate Crystals, UA Small/1+ None Seen /HPF    Amorphous Crystals, UA  Moderate/2+ None Seen /HPF    Mucus, UA Moderate/2+ (A) None Seen, Trace /HPF    Methodology Manual Light Microscopy    Comprehensive Metabolic Panel    Collection Time: 10/15/22 10:38 AM    Specimen: Blood   Result Value Ref Range    Glucose 104 (H) 65 - 99 mg/dL    BUN 6 6 - 20 mg/dL    Creatinine 0.53 (L) 0.57 - 1.00 mg/dL    Sodium 133 (L) 136 - 145 mmol/L    Potassium 2.5 (C) 3.5 - 5.2 mmol/L    Chloride 91 (L) 98 - 107 mmol/L    CO2 30.0 (H) 22.0 - 29.0 mmol/L    Calcium 8.7 8.6 - 10.5 mg/dL    Total Protein 5.1 (L) 6.0 - 8.5 g/dL    Albumin 3.00 (L) 3.50 - 5.20 g/dL    ALT (SGPT) 36 (H) 1 - 33 U/L    AST (SGOT) 49 (H) 1 - 32 U/L    Alkaline Phosphatase 71 39 - 117 U/L    Total Bilirubin 0.5 0.0 - 1.2 mg/dL    Globulin 2.1 gm/dL    A/G Ratio 1.4 g/dL    BUN/Creatinine Ratio 11.3 7.0 - 25.0    Anion Gap 12.0 5.0 - 15.0 mmol/L    eGFR 112.1 >60.0 mL/min/1.73   Lipase    Collection Time: 10/15/22 10:38 AM    Specimen: Blood   Result Value Ref Range    Lipase 14 13 - 60 U/L   Green Top (Gel)    Collection Time: 10/15/22 10:38 AM   Result Value Ref Range    Extra Tube Hold for add-ons.    Magnesium    Collection Time: 10/15/22 10:38 AM    Specimen: Blood   Result Value Ref Range    Magnesium 1.3 (L) 1.6 - 2.6 mg/dL   TSH    Collection Time: 10/15/22 10:38 AM    Specimen: Blood   Result Value Ref Range    TSH 1.570 0.270 - 4.200 uIU/mL       If labs were ordered, I independently reviewed the results.        RADIOLOGY  CT Abdomen Pelvis With Contrast    Result Date: 10/15/2022  DATE OF EXAM: 10/15/2022 10:17 AM  PROCEDURE: CT ABDOMEN PELVIS W CONTRAST-  INDICATIONS: cyclical vomiting  COMPARISON: No comparisons available.  TECHNIQUE: Routine transaxial slices were obtained through the abdomen and pelvis after the intravenous administration of 100 mL of Isovue 300. Reconstructed coronal and sagittal images were also obtained. Automated exposure control and iterative construction methods were used.  The radiation dose  reduction device was turned on for each scan per the ALARA (As Low as Reasonably Achievable) protocol.  FINDINGS: The lung bases are clear.  There is focal steatosis within the left hepatic lobe near the falciform ligament. The gallbladder, adrenal glands, right kidney, spleen, and pancreas are unremarkable. There is left renal cortical scarring.  The stomach appears normal. The small bowel appears normal in caliber and configuration. The colon appears normal. The appendix is not well-visualized. There is no ascites or loculated collection. No abnormally enlarged lymph nodes are identified.  The rectum and urinary bladder are unremarkable. The uterus is surgically absent.  No aggressive osseous lesions are identified.      No acute process identified within abdomen/pelvis.  This report was finalized on 10/15/2022 10:59 AM by Donny Pozo MD.          PROCEDURES    Procedures    No orders to display       MEDICATIONS GIVEN IN ER    Medications   Sodium Chloride (PF) 0.9 % 10 mL (has no administration in time range)   FLUoxetine (PROzac) capsule 10 mg (has no administration in time range)   levETIRAcetam (KEPPRA) tablet 750 mg (has no administration in time range)   topiramate (TOPAMAX) tablet 100 mg (has no administration in time range)   sodium chloride 0.9 % flush 10 mL (has no administration in time range)   sodium chloride 0.9 % flush 10 mL (has no administration in time range)   potassium chloride (MICRO-K) CR capsule 40 mEq ( Oral Not Given:  See Alt 10/15/22 1350)     Or   potassium chloride (KLOR-CON) packet 40 mEq ( Oral Not Given:  See Alt 10/15/22 1350)     Or   potassium chloride 10 mEq in 100 mL IVPB (10 mEq Intravenous New Bag 10/15/22 1350)   Magnesium Sulfate 2 gram Bolus, followed by 8 gram infusion (total Mg dose 10 grams)- Mg less than or equal to 1mg/dL ( Intravenous Not Given:  See Alt 10/15/22 1351)     Or   Magnesium Sulfate 2 gram / 50mL Infusion (GIVE X 3 BAGS TO EQUAL 6GM TOTAL DOSE) -  Mg 1.1 - 1.5 mg/dl (2 g Intravenous New Bag 10/15/22 1351)     Or   Magnesium Sulfate 4 gram infusion- Mg 1.6-1.9 mg/dL ( Intravenous Not Given:  See Alt 10/15/22 1351)   ondansetron (ZOFRAN) injection 4 mg (has no administration in time range)   prochlorperazine (COMPAZINE) injection 5 mg (has no administration in time range)     Or   prochlorperazine (COMPAZINE) tablet 5 mg (has no administration in time range)     Or   prochlorperazine (COMPAZINE) suppository 25 mg (has no administration in time range)   dextrose 5 % and sodium chloride 0.9 % with KCl 20 mEq/L infusion (has no administration in time range)   pantoprazole (PROTONIX) EC tablet 40 mg (has no administration in time range)   multivitamin (THERAGRAN) tablet 1 tablet (has no administration in time range)   sodium chloride 0.9 % bolus 1,000 mL (0 mL Intravenous Stopped 10/15/22 1331)   ondansetron (ZOFRAN) injection 4 mg (4 mg Intravenous Given 10/15/22 1010)   iopamidol (ISOVUE-300) 61 % injection 100 mL (100 mL Intravenous Given 10/15/22 1025)       ED Course as of 10/15/22 1509   Sat Oct 15, 2022   1116 Albumin(!): 3.00 [MS]   1138 WBC, UA(!): 6-12 [MS]   1138 Ketones, UA(!): 15 mg/dL (1+) [MS]   1138 Albumin(!): 3.00 [MS]   1508 Discussed patient's presentation with gastroenterologist, Dr. Brunner who agrees to consult.  Patient's work-up remarkable for hypokalemia.  Her vital signs specifically, tachycardia had improved with IV fluid infusion.  Patient and her  understand and concur with inpatient plan.  Dr. Graves excepts inpatient care [MS]      ED Course User Index  [MS] Ayanna Saeed APRN             AS OF 15:09 EDT VITALS:    BP - 136/94  HR - 93  TEMP - 98.9 °F (37.2 °C) (Oral)  O2 SATS - 97%                  DIAGNOSIS  Final diagnoses:   Intractable vomiting   Hypokalemia         DISPOSITION    Admitted              Ayanna Saeed APRN  10/15/22 1574

## 2022-10-15 NOTE — PLAN OF CARE
Goal Outcome Evaluation:  Plan of Care Reviewed With: patient         VSS, RA, no c/o pain or nausea at this time. Potassium and magnesium are being replaced at this time. GI to see and evaluate patient. Significant other at bedside. Will continue current plan of care

## 2022-10-16 PROBLEM — E87.1 HYPONATREMIA: Status: ACTIVE | Noted: 2022-10-16

## 2022-10-16 PROBLEM — G40.909 SEIZURE DISORDER (HCC): Status: ACTIVE | Noted: 2022-10-16

## 2022-10-16 PROBLEM — E87.6 HYPOKALEMIA: Status: ACTIVE | Noted: 2022-10-16

## 2022-10-16 LAB
ALBUMIN SERPL-MCNC: 2.9 G/DL (ref 3.5–5.2)
ALBUMIN/GLOB SERPL: 1.5 G/DL
ALP SERPL-CCNC: 74 U/L (ref 39–117)
ALT SERPL W P-5'-P-CCNC: 45 U/L (ref 1–33)
ANION GAP SERPL CALCULATED.3IONS-SCNC: 6 MMOL/L (ref 5–15)
AST SERPL-CCNC: 89 U/L (ref 1–32)
BASOPHILS # BLD AUTO: 0.02 10*3/MM3 (ref 0–0.2)
BASOPHILS NFR BLD AUTO: 0.3 % (ref 0–1.5)
BILIRUB SERPL-MCNC: 0.4 MG/DL (ref 0–1.2)
BUN SERPL-MCNC: 4 MG/DL (ref 6–20)
BUN/CREAT SERPL: 8.5 (ref 7–25)
CALCIUM SPEC-SCNC: 7.8 MG/DL (ref 8.6–10.5)
CHLORIDE SERPL-SCNC: 102 MMOL/L (ref 98–107)
CO2 SERPL-SCNC: 25 MMOL/L (ref 22–29)
CREAT SERPL-MCNC: 0.47 MG/DL (ref 0.57–1)
DEPRECATED RDW RBC AUTO: 46.7 FL (ref 37–54)
EGFRCR SERPLBLD CKD-EPI 2021: 115.4 ML/MIN/1.73
EOSINOPHIL # BLD AUTO: 0.06 10*3/MM3 (ref 0–0.4)
EOSINOPHIL NFR BLD AUTO: 0.8 % (ref 0.3–6.2)
ERYTHROCYTE [DISTWIDTH] IN BLOOD BY AUTOMATED COUNT: 13.6 % (ref 12.3–15.4)
GLOBULIN UR ELPH-MCNC: 1.9 GM/DL
GLUCOSE SERPL-MCNC: 108 MG/DL (ref 65–99)
HAV IGM SERPL QL IA: NORMAL
HBV CORE IGM SERPL QL IA: NORMAL
HBV SURFACE AG SERPL QL IA: NORMAL
HCT VFR BLD AUTO: 34.7 % (ref 34–46.6)
HCV AB SER DONR QL: NORMAL
HGB BLD-MCNC: 12.1 G/DL (ref 12–15.9)
IMM GRANULOCYTES # BLD AUTO: 0.01 10*3/MM3 (ref 0–0.05)
IMM GRANULOCYTES NFR BLD AUTO: 0.1 % (ref 0–0.5)
LYMPHOCYTES # BLD AUTO: 2.56 10*3/MM3 (ref 0.7–3.1)
LYMPHOCYTES NFR BLD AUTO: 33.6 % (ref 19.6–45.3)
MAGNESIUM SERPL-MCNC: 2.4 MG/DL (ref 1.6–2.6)
MCH RBC QN AUTO: 32.5 PG (ref 26.6–33)
MCHC RBC AUTO-ENTMCNC: 34.9 G/DL (ref 31.5–35.7)
MCV RBC AUTO: 93.3 FL (ref 79–97)
MONOCYTES # BLD AUTO: 0.54 10*3/MM3 (ref 0.1–0.9)
MONOCYTES NFR BLD AUTO: 7.1 % (ref 5–12)
NEUTROPHILS NFR BLD AUTO: 4.44 10*3/MM3 (ref 1.7–7)
NEUTROPHILS NFR BLD AUTO: 58.1 % (ref 42.7–76)
NRBC BLD AUTO-RTO: 0 /100 WBC (ref 0–0.2)
PLATELET # BLD AUTO: 268 10*3/MM3 (ref 140–450)
PMV BLD AUTO: 9.7 FL (ref 6–12)
POTASSIUM SERPL-SCNC: 3.4 MMOL/L (ref 3.5–5.2)
POTASSIUM SERPL-SCNC: 4.6 MMOL/L (ref 3.5–5.2)
POTASSIUM SERPL-SCNC: 5 MMOL/L (ref 3.5–5.2)
PROT SERPL-MCNC: 4.8 G/DL (ref 6–8.5)
RBC # BLD AUTO: 3.72 10*6/MM3 (ref 3.77–5.28)
SODIUM SERPL-SCNC: 133 MMOL/L (ref 136–145)
WBC NRBC COR # BLD: 7.63 10*3/MM3 (ref 3.4–10.8)

## 2022-10-16 PROCEDURE — 80074 ACUTE HEPATITIS PANEL: CPT | Performed by: INTERNAL MEDICINE

## 2022-10-16 PROCEDURE — 85025 COMPLETE CBC W/AUTO DIFF WBC: CPT | Performed by: HOSPITALIST

## 2022-10-16 PROCEDURE — 83735 ASSAY OF MAGNESIUM: CPT | Performed by: HOSPITALIST

## 2022-10-16 PROCEDURE — 84132 ASSAY OF SERUM POTASSIUM: CPT | Performed by: HOSPITALIST

## 2022-10-16 PROCEDURE — 99232 SBSQ HOSP IP/OBS MODERATE 35: CPT | Performed by: INTERNAL MEDICINE

## 2022-10-16 PROCEDURE — 0 POTASSIUM CHLORIDE 10 MEQ/100ML SOLUTION: Performed by: HOSPITALIST

## 2022-10-16 PROCEDURE — 80053 COMPREHEN METABOLIC PANEL: CPT | Performed by: HOSPITALIST

## 2022-10-16 PROCEDURE — 99233 SBSQ HOSP IP/OBS HIGH 50: CPT | Performed by: HOSPITALIST

## 2022-10-16 RX ADMIN — POTASSIUM CHLORIDE 10 MEQ: 7.46 INJECTION, SOLUTION INTRAVENOUS at 02:04

## 2022-10-16 RX ADMIN — FLUOXETINE 10 MG: 10 CAPSULE ORAL at 09:28

## 2022-10-16 RX ADMIN — LEVETIRACETAM 750 MG: 250 TABLET, FILM COATED ORAL at 20:37

## 2022-10-16 RX ADMIN — POTASSIUM CHLORIDE 10 MEQ: 7.46 INJECTION, SOLUTION INTRAVENOUS at 04:06

## 2022-10-16 RX ADMIN — Medication 10 ML: at 20:37

## 2022-10-16 RX ADMIN — DEXTROSE MONOHYDRATE, SODIUM CHLORIDE, AND POTASSIUM CHLORIDE 75 ML/HR: 50; 9; 1.49 INJECTION, SOLUTION INTRAVENOUS at 17:28

## 2022-10-16 RX ADMIN — MULTIVITAMIN TABLET 1 TABLET: TABLET at 09:28

## 2022-10-16 RX ADMIN — DEXTROSE MONOHYDRATE, SODIUM CHLORIDE, AND POTASSIUM CHLORIDE 75 ML/HR: 50; 9; 1.49 INJECTION, SOLUTION INTRAVENOUS at 03:05

## 2022-10-16 RX ADMIN — Medication 10 ML: at 09:28

## 2022-10-16 RX ADMIN — POTASSIUM CHLORIDE 10 MEQ: 7.46 INJECTION, SOLUTION INTRAVENOUS at 05:10

## 2022-10-16 RX ADMIN — PANTOPRAZOLE SODIUM 40 MG: 40 TABLET, DELAYED RELEASE ORAL at 05:10

## 2022-10-16 RX ADMIN — POTASSIUM CHLORIDE 10 MEQ: 7.46 INJECTION, SOLUTION INTRAVENOUS at 03:05

## 2022-10-16 RX ADMIN — LEVETIRACETAM 750 MG: 250 TABLET, FILM COATED ORAL at 09:27

## 2022-10-16 NOTE — PROGRESS NOTES
"GI Daily Progress Note  Subjective:    Chief Complaint:  F/u N/V    Nausea improved. No further emesis. Eating 25% of meals.    Objective:    /92 (BP Location: Left arm, Patient Position: Lying)   Pulse 86   Temp 98 °F (36.7 °C) (Oral)   Resp 16   Ht 157.5 cm (62\")   Wt 36.3 kg (80 lb)   SpO2 97%   BMI 14.63 kg/m²     Physical Exam  Constitutional:       General: She is not in acute distress.     Appearance: She is ill-appearing. She is not toxic-appearing or diaphoretic.      Comments: Underweight and chronically ill.   HENT:      Head: Normocephalic.      Nose: Nose normal. No congestion.      Mouth/Throat:      Mouth: Mucous membranes are moist.      Pharynx: No oropharyngeal exudate.   Eyes:      General: No scleral icterus.     Conjunctiva/sclera: Conjunctivae normal.   Cardiovascular:      Rate and Rhythm: Normal rate.      Pulses: Normal pulses.   Pulmonary:      Effort: Pulmonary effort is normal. No respiratory distress.      Breath sounds: No stridor. No wheezing or rales.   Abdominal:      General: Bowel sounds are normal. There is no distension.      Palpations: Abdomen is soft.      Tenderness: There is no abdominal tenderness. There is no guarding.   Genitourinary:     Comments: deferred  Musculoskeletal:         General: Normal range of motion.      Cervical back: Normal range of motion.      Right lower leg: No edema.      Left lower leg: No edema.   Skin:     General: Skin is warm and dry.      Capillary Refill: Capillary refill takes less than 2 seconds.      Coloration: Skin is not jaundiced or pale.      Findings: No erythema or rash.   Neurological:      General: No focal deficit present.      Mental Status: She is alert and oriented to person, place, and time.   Psychiatric:         Mood and Affect: Mood normal.         Behavior: Behavior normal.       Lab  Lab Results   Component Value Date    WBC 7.63 10/16/2022    HGB 12.1 10/16/2022    HGB 14.2 10/15/2022    HGB 13.9 01/07/2020 "    MCV 93.3 10/16/2022     10/16/2022       Lab Results   Component Value Date    GLUCOSE 108 (H) 10/16/2022    BUN 4 (L) 10/16/2022    CREATININE 0.47 (L) 10/16/2022    BCR 8.5 10/16/2022     (L) 10/16/2022    K 4.6 10/16/2022    CO2 25.0 10/16/2022    CALCIUM 7.8 (L) 10/16/2022    ALBUMIN 2.90 (L) 10/16/2022    ALKPHOS 74 10/16/2022    BILITOT 0.4 10/16/2022    ALT 45 (H) 10/16/2022    AST 89 (H) 10/16/2022       Assessment:    1.  Chronic severe nausea and vomiting, improving.  At risk for SMA syndrome.  At risk for peptic ulcer disease (NSAID use).  2.  Hyponatremic, hypokalemic, metabolic alkalosis consistent with significant vomiting. Electrolytes improved.  3.  Weight loss.  4.  Severe protein calorie malnutrition and underweight.  5.  Borderline elevated transaminases, worsening. Focal fatty liver on CT.  Suspect related to severe malnutrition.  6.  Seizure disorder.  Patient reports good control of seizures on Keppra since 2004.    Plan:    >> EGD tomorrow.  >> If transaminases worse tomorrow, will proceed with full hepatitis work-up.    Mark I. Brunner, MD  10/16/22  17:08 EDT

## 2022-10-16 NOTE — PLAN OF CARE
Problem: Adult Inpatient Plan of Care  Goal: Plan of Care Review  Outcome: Ongoing, Progressing  Flowsheets (Taken 10/16/2022 1713)  Outcome Evaluation: VSS, RA. No complaints of nausea or pain today. Pt tolerating food, eating about 25%. Plan for EGD tomorow, NPO at midnight. No other concerns at this time.   Goal Outcome Evaluation:              Outcome Evaluation: VSS, RA. No complaints of nausea or pain today. Pt tolerating food, eating about 25%. Plan for EGD tomorow, NPO at midnight. No other concerns at this time.

## 2022-10-16 NOTE — PROGRESS NOTES
[facility] Medicine Services  PROGRESS NOTE    Patient Name: Lucinda Koch  : 1970  MRN: [id number]    Date of Admission: 10/15/2022  Primary Care Physician: Jese Tran MD    Subjective   Subjective     CC:  F/U N/V, weight loss    HPI:  Seen this morning, feels a little better. Was able to keep food and drink down this morning. Complains of some numbness and pain in her toes, has been going on for months.     ROS:  Gen-no fevers, no chills  CV-no chest pain, no palpitations  Resp-no cough, no dyspnea  GI-no N/V currently, no abd pain    All other systems reviewed and negative except any additional pertinent positives and negatives as discussed in HPI.    Objective   Objective     Vital Signs:   Temp:  [97.5 °F (36.4 °C)-98.9 °F (37.2 °C)] 98.2 °F (36.8 °C)  Heart Rate:  [82-98] 91  Resp:  [16-18] 18  BP: (128-144)/(88-98) 128/92     Physical Exam:  Gen-no acute distress, very thin   HENT-NCAT, mucous membranes moist  CV-RRR, S1 S2 normal, no m/r/g  Resp-CTAB, no wheezes or rales  Abd-soft, NT, ND, +BS  Ext-no edema  Neuro-A&Ox3, no focal deficits  Skin-no rashes  Psych-appropriate mood    Results Reviewed:  LAB RESULTS:      Lab 10/16/22  0640 10/15/22  1000   WBC 7.63 7.64   HEMOGLOBIN 12.1 14.2   HEMATOCRIT 34.7 40.0   PLATELETS 268 273   NEUTROS ABS 4.44 5.49   IMMATURE GRANS (ABS) 0.01 0.04   LYMPHS ABS 2.56 1.51   MONOS ABS 0.54 0.57   EOS ABS 0.06 0.02   MCV 93.3 92.2   LACTATE  --  1.8         Lab 10/16/22  0640 10/16/22  0102 10/15/22  1038   SODIUM 133*  --  133*   POTASSIUM 5.0 3.4* 2.5*   CHLORIDE 102  --  91*   CO2 25.0  --  30.0*   ANION GAP 6.0  --  12.0   BUN 4*  --  6   CREATININE 0.47*  --  0.53*   EGFR 115.4  --  112.1   GLUCOSE 108*  --  104*   CALCIUM 7.8*  --  8.7   MAGNESIUM 2.4  --  1.3*   TSH  --   --  1.570         Lab 10/16/22  0640 10/15/22  1038   TOTAL PROTEIN 4.8* 5.1*   ALBUMIN 2.90* 3.00*   GLOBULIN 1.9 2.1   ALT (SGPT) 45* 36*   AST  (SGOT) 89* 49*   BILIRUBIN 0.4 0.5   ALK PHOS 74 71   LIPASE  --  14                 Lab 10/15/22  1630   FOLATE 16.60   VITAMIN B 12 335         Brief Urine Lab Results  (Last result in the past 365 days)      Color   Clarity   Blood   Leuk Est   Nitrite   Protein   CREAT   Urine HCG        10/15/22 1000 Yellow   Cloudy   Negative   Negative   Negative   Negative                 Microbiology Results Abnormal     None          CT Abdomen Pelvis With Contrast    Result Date: 10/15/2022  DATE OF EXAM: 10/15/2022 10:17 AM  PROCEDURE: CT ABDOMEN PELVIS W CONTRAST-  INDICATIONS: cyclical vomiting  COMPARISON: No comparisons available.  TECHNIQUE: Routine transaxial slices were obtained through the abdomen and pelvis after the intravenous administration of 100 mL of Isovue 300. Reconstructed coronal and sagittal images were also obtained. Automated exposure control and iterative construction methods were used.  The radiation dose reduction device was turned on for each scan per the ALARA (As Low as Reasonably Achievable) protocol.  FINDINGS: The lung bases are clear.  There is focal steatosis within the left hepatic lobe near the falciform ligament. The gallbladder, adrenal glands, right kidney, spleen, and pancreas are unremarkable. There is left renal cortical scarring.  The stomach appears normal. The small bowel appears normal in caliber and configuration. The colon appears normal. The appendix is not well-visualized. There is no ascites or loculated collection. No abnormally enlarged lymph nodes are identified.  The rectum and urinary bladder are unremarkable. The uterus is surgically absent.  No aggressive osseous lesions are identified.      Impression: No acute process identified within abdomen/pelvis.  This report was finalized on 10/15/2022 10:59 AM by Donny Pozo MD.            I have reviewed the medications:  Scheduled Meds:FLUoxetine, 10 mg, Oral, Daily  levETIRAcetam, 750 mg, Oral, BID  multivitamin, 1  tablet, Oral, Daily  pantoprazole, 40 mg, Oral, Q AM  sodium chloride, 10 mL, Intravenous, Q12H  topiramate, 100 mg, Oral, BID      Continuous Infusions:dextrose 5 % and sodium chloride 0.9 % with KCl 20 mEq, 75 mL/hr, Last Rate: 75 mL/hr (10/16/22 0305)      PRN Meds:.•  magnesium sulfate **OR** magnesium sulfate **OR** magnesium sulfate  •  ondansetron  •  potassium chloride **OR** potassium chloride **OR** potassium chloride  •  prochlorperazine **OR** prochlorperazine **OR** prochlorperazine  •  Sodium Chloride (PF)  •  sodium chloride    Assessment & Plan   Assessment & Plan     Active Hospital Problems    Diagnosis  POA   • **Intractable vomiting [R11.10]  Yes   • Seizure disorder (HCC) [G40.909]  Yes   • Hypokalemia [E87.6]  Yes   • Hyponatremia [E87.1]  Yes   • Severe malnutrition (HCC) [E43]  Unknown      Resolved Hospital Problems   No resolved problems to display.        Brief Hospital Course to date:  Lucinda Koch is a 52 y.o. female with hx of depression, anxiety, migraines, and seizures who presents due to 6 month history of progressively worsening nausea with almost daily vomiting. Also notes 40 lb weight loss. Has been seen in multiple ER's without a diagnosis. Admitted for further management.    *All problems are new to me today.    Intractable nausea and vomiting  Adult failure to thrive  Severe malnutrition  Neuropathy  --CT A/P unremarkable.  --TSH, folate normal. B12 on the lower end. 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  --GI consulted, planning for EGD tomorrow. Keep NPO after midnight.   --Continue PPI, PRN nausea meds.   --Nutrition consult. At risk for refeeding syndrome.    Hypokalemia  Hyponatremia, not clinically significant  Metabolic alkalosis  --Due to vomiting, volume depletion.  --Improved with fluids and electrolyte replacement.  --Monitor.     Mild transaminitis  --CT scan with focal fatty liver.  --Monitor.    Migraines  --Continue on Topamax. GI recommends considering  alternative agent as this medication can cause nausea and weight loss.     Seizure disorder  --Continue Keppra.    Depression/anxiety  --Continue Prozac.    Expected Discharge Location and Transportation: home  Expected Discharge Date: 10/18/22, TBD    DVT prophylaxis:  Mechanical DVT prophylaxis orders are present.     AM-PAC 6 Clicks Score (PT): 22 (10/16/22 0800)    CODE STATUS:   Code Status and Medical Interventions:   Ordered at: 10/15/22 1221     Code Status (Patient has no pulse and is not breathing):    CPR (Attempt to Resuscitate)     Medical Interventions (Patient has pulse or is breathing):    Full Support       Shannan Graves MD  10/16/22

## 2022-10-16 NOTE — CASE MANAGEMENT/SOCIAL WORK
Discharge Planning Assessment  [facility]     Patient Name: Lucinda Koch  MRN: [id number]  Today's Date: 10/16/2022    Admit Date: 10/15/2022    Plan: home   Discharge Needs Assessment     Row Name 10/16/22 0949       Living Environment    People in Home significant other    Current Living Arrangements home    Primary Care Provided by self    Family Caregiver if Needed none       Resource/Environmental Concerns    Resource/Environmental Concerns none       Transition Planning    Patient/Family Anticipates Transition to home with family    Patient/Family Anticipated Services at Transition none       Discharge Needs Assessment    Equipment Currently Used at Home none    Equipment Needed After Discharge none               Discharge Plan     Row Name 10/16/22 0949       Plan    Plan home    Patient/Family in Agreement with Plan yes    Plan Comments Spoke with patient at bedside. Patient lives with Maciej Amador, Significant other in [facility]. Patient is independent with ADLs. Not current with  Services and use no DME's. PCP is Jese Chaudhry. Insurance is [employer]. Patient plan is home with private care to transport. No other discharge needs at this time. CM will follow.    Final Discharge Disposition Code 01 - home or self-care              Continued Care and Services - Admitted Since 10/15/2022    Coordination has not been started for this encounter.          Demographic Summary     Row Name 10/16/22 0948       General Information    Preferred Language English               Functional Status     Row Name 10/16/22 0948       Functional Status    Usual Activity Tolerance moderate    Current Activity Tolerance moderate       Functional Status, IADL    Medications independent    Meal Preparation independent    Housekeeping independent    Laundry independent    Shopping independent               Psychosocial    No documentation.                Abuse/Neglect    No documentation.                Legal    No  documentation.                Substance Abuse    No documentation.                Patient Forms    No documentation.                   Ryann Aragon RN

## 2022-10-17 ENCOUNTER — ANESTHESIA (OUTPATIENT)
Dept: GASTROENTEROLOGY | Facility: HOSPITAL | Age: 52
End: 2022-10-17

## 2022-10-17 ENCOUNTER — ANESTHESIA EVENT (OUTPATIENT)
Dept: GASTROENTEROLOGY | Facility: HOSPITAL | Age: 52
End: 2022-10-17

## 2022-10-17 VITALS
DIASTOLIC BLOOD PRESSURE: 102 MMHG | HEIGHT: 62 IN | OXYGEN SATURATION: 96 % | SYSTOLIC BLOOD PRESSURE: 148 MMHG | HEART RATE: 109 BPM | BODY MASS INDEX: 14.72 KG/M2 | TEMPERATURE: 98.4 F | RESPIRATION RATE: 16 BRPM | WEIGHT: 80 LBS

## 2022-10-17 PROBLEM — E87.6 HYPOKALEMIA: Status: RESOLVED | Noted: 2022-10-16 | Resolved: 2022-10-17

## 2022-10-17 PROBLEM — E87.1 HYPONATREMIA: Status: RESOLVED | Noted: 2022-10-16 | Resolved: 2022-10-17

## 2022-10-17 PROBLEM — R11.10 INTRACTABLE VOMITING: Status: RESOLVED | Noted: 2022-10-15 | Resolved: 2022-10-17

## 2022-10-17 LAB
ALBUMIN SERPL-MCNC: 2.7 G/DL (ref 3.5–5.2)
ALP SERPL-CCNC: 72 U/L (ref 39–117)
ALT SERPL W P-5'-P-CCNC: 66 U/L (ref 1–33)
ANION GAP SERPL CALCULATED.3IONS-SCNC: 7 MMOL/L (ref 5–15)
AST SERPL-CCNC: 77 U/L (ref 1–32)
BILIRUB CONJ SERPL-MCNC: <0.2 MG/DL (ref 0–0.3)
BILIRUB INDIRECT SERPL-MCNC: ABNORMAL MG/DL
BILIRUB SERPL-MCNC: 0.3 MG/DL (ref 0–1.2)
BUN SERPL-MCNC: 2 MG/DL (ref 6–20)
BUN/CREAT SERPL: 4.8 (ref 7–25)
CALCIUM SPEC-SCNC: 8.3 MG/DL (ref 8.6–10.5)
CHLORIDE SERPL-SCNC: 107 MMOL/L (ref 98–107)
CO2 SERPL-SCNC: 24 MMOL/L (ref 22–29)
CREAT SERPL-MCNC: 0.42 MG/DL (ref 0.57–1)
DEPRECATED RDW RBC AUTO: 51.4 FL (ref 37–54)
EGFRCR SERPLBLD CKD-EPI 2021: 118.6 ML/MIN/1.73
ERYTHROCYTE [DISTWIDTH] IN BLOOD BY AUTOMATED COUNT: 13.8 % (ref 12.3–15.4)
GLUCOSE SERPL-MCNC: 95 MG/DL (ref 65–99)
HCT VFR BLD AUTO: 35.4 % (ref 34–46.6)
HGB BLD-MCNC: 11.6 G/DL (ref 12–15.9)
MCH RBC QN AUTO: 33.2 PG (ref 26.6–33)
MCHC RBC AUTO-ENTMCNC: 32.8 G/DL (ref 31.5–35.7)
MCV RBC AUTO: 101.4 FL (ref 79–97)
PLATELET # BLD AUTO: 232 10*3/MM3 (ref 140–450)
PMV BLD AUTO: 10.8 FL (ref 6–12)
POTASSIUM SERPL-SCNC: 4.9 MMOL/L (ref 3.5–5.2)
PROT SERPL-MCNC: 4.4 G/DL (ref 6–8.5)
RBC # BLD AUTO: 3.49 10*6/MM3 (ref 3.77–5.28)
SODIUM SERPL-SCNC: 138 MMOL/L (ref 136–145)
WBC NRBC COR # BLD: 5.57 10*3/MM3 (ref 3.4–10.8)

## 2022-10-17 PROCEDURE — 88342 IMHCHEM/IMCYTCHM 1ST ANTB: CPT | Performed by: INTERNAL MEDICINE

## 2022-10-17 PROCEDURE — 0DJ08ZZ INSPECTION OF UPPER INTESTINAL TRACT, VIA NATURAL OR ARTIFICIAL OPENING ENDOSCOPIC: ICD-10-PCS | Performed by: INTERNAL MEDICINE

## 2022-10-17 PROCEDURE — 97161 PT EVAL LOW COMPLEX 20 MIN: CPT

## 2022-10-17 PROCEDURE — 80076 HEPATIC FUNCTION PANEL: CPT | Performed by: INTERNAL MEDICINE

## 2022-10-17 PROCEDURE — 88305 TISSUE EXAM BY PATHOLOGIST: CPT | Performed by: INTERNAL MEDICINE

## 2022-10-17 PROCEDURE — 97530 THERAPEUTIC ACTIVITIES: CPT

## 2022-10-17 PROCEDURE — 85027 COMPLETE CBC AUTOMATED: CPT | Performed by: HOSPITALIST

## 2022-10-17 PROCEDURE — 99239 HOSP IP/OBS DSCHRG MGMT >30: CPT | Performed by: HOSPITALIST

## 2022-10-17 PROCEDURE — 43239 EGD BIOPSY SINGLE/MULTIPLE: CPT | Performed by: INTERNAL MEDICINE

## 2022-10-17 PROCEDURE — 80048 BASIC METABOLIC PNL TOTAL CA: CPT | Performed by: HOSPITALIST

## 2022-10-17 PROCEDURE — 25010000002 PROPOFOL 10 MG/ML EMULSION

## 2022-10-17 PROCEDURE — 0DB78ZX EXCISION OF STOMACH, PYLORUS, VIA NATURAL OR ARTIFICIAL OPENING ENDOSCOPIC, DIAGNOSTIC: ICD-10-PCS | Performed by: INTERNAL MEDICINE

## 2022-10-17 RX ORDER — FENTANYL CITRATE 50 UG/ML
50 INJECTION, SOLUTION INTRAMUSCULAR; INTRAVENOUS
Status: CANCELLED | OUTPATIENT
Start: 2022-10-17

## 2022-10-17 RX ORDER — LIDOCAINE HYDROCHLORIDE 10 MG/ML
INJECTION, SOLUTION EPIDURAL; INFILTRATION; INTRACAUDAL; PERINEURAL AS NEEDED
Status: DISCONTINUED | OUTPATIENT
Start: 2022-10-17 | End: 2022-10-17 | Stop reason: SURG

## 2022-10-17 RX ORDER — HYDROMORPHONE HYDROCHLORIDE 1 MG/ML
0.5 INJECTION, SOLUTION INTRAMUSCULAR; INTRAVENOUS; SUBCUTANEOUS
Status: CANCELLED | OUTPATIENT
Start: 2022-10-17

## 2022-10-17 RX ORDER — SODIUM CHLORIDE, SODIUM LACTATE, POTASSIUM CHLORIDE, CALCIUM CHLORIDE 600; 310; 30; 20 MG/100ML; MG/100ML; MG/100ML; MG/100ML
30 INJECTION, SOLUTION INTRAVENOUS CONTINUOUS
Status: DISCONTINUED | OUTPATIENT
Start: 2022-10-17 | End: 2022-10-17 | Stop reason: HOSPADM

## 2022-10-17 RX ORDER — PROPOFOL 10 MG/ML
VIAL (ML) INTRAVENOUS AS NEEDED
Status: DISCONTINUED | OUTPATIENT
Start: 2022-10-17 | End: 2022-10-17 | Stop reason: SURG

## 2022-10-17 RX ORDER — PANTOPRAZOLE SODIUM 40 MG/1
40 TABLET, DELAYED RELEASE ORAL
Qty: 30 TABLET | Refills: 0 | Status: SHIPPED | OUTPATIENT
Start: 2022-10-18 | End: 2022-11-09

## 2022-10-17 RX ORDER — ONDANSETRON 4 MG/1
4 TABLET, ORALLY DISINTEGRATING ORAL EVERY 8 HOURS PRN
Qty: 20 TABLET | Refills: 0 | Status: SHIPPED | OUTPATIENT
Start: 2022-10-17 | End: 2022-11-09

## 2022-10-17 RX ADMIN — PANTOPRAZOLE SODIUM 40 MG: 40 TABLET, DELAYED RELEASE ORAL at 06:16

## 2022-10-17 RX ADMIN — SODIUM CHLORIDE, POTASSIUM CHLORIDE, SODIUM LACTATE AND CALCIUM CHLORIDE 30 ML/HR: 600; 310; 30; 20 INJECTION, SOLUTION INTRAVENOUS at 08:17

## 2022-10-17 RX ADMIN — PROPOFOL 50 MCG/KG/MIN: 10 INJECTION, EMULSION INTRAVENOUS at 09:24

## 2022-10-17 RX ADMIN — Medication 10 ML: at 10:14

## 2022-10-17 RX ADMIN — MULTIVITAMIN TABLET 1 TABLET: TABLET at 10:14

## 2022-10-17 RX ADMIN — PROPOFOL 70 MG: 10 INJECTION, EMULSION INTRAVENOUS at 09:24

## 2022-10-17 RX ADMIN — LIDOCAINE HYDROCHLORIDE 100 MG: 10 INJECTION, SOLUTION EPIDURAL; INFILTRATION; INTRACAUDAL; PERINEURAL at 09:24

## 2022-10-17 RX ADMIN — DEXTROSE MONOHYDRATE, SODIUM CHLORIDE, AND POTASSIUM CHLORIDE 75 ML/HR: 50; 9; 1.49 INJECTION, SOLUTION INTRAVENOUS at 06:16

## 2022-10-17 RX ADMIN — LEVETIRACETAM 750 MG: 250 TABLET, FILM COATED ORAL at 10:14

## 2022-10-17 RX ADMIN — FLUOXETINE 10 MG: 10 CAPSULE ORAL at 10:14

## 2022-10-17 NOTE — PLAN OF CARE
Goal Outcome Evaluation:               Pt A/O X4, RA, NSR, No c/o nausea or vomitting at this time.Has been N.P.O from midnight for EGD today.

## 2022-10-17 NOTE — THERAPY EVALUATION
Patient Name: Lucinda Koch  : 1970    MRN: [id number]                              Today's Date: 10/17/2022       Admit Date: 10/15/2022    Visit Dx:     ICD-10-CM ICD-9-CM   1. Intractable vomiting  R11.10 536.2   2. Hypokalemia  E87.6 276.8     Patient Active Problem List   Diagnosis   • Convulsions (HCC)   • Severe malnutrition (HCC)   • Seizure disorder (HCC)     Past Medical History:   Diagnosis Date   • Depression    • Migraine    • Seizures (HCC)      History reviewed. No pertinent surgical history.   General Information     Row Name 10/17/22 [facility]5          Physical Therapy Time and Intention    Document Type evaluation  -AE     Mode of Treatment physical therapy  -AE     Row Name 10/17/22 [facility]5          General Information    Patient Profile Reviewed yes  -AE     Prior Level of Function independent:;all household mobility;gait;transfer;bed mobility;ADL's;dressing;bathing  -AE     Existing Precautions/Restrictions fall  -AE     Barriers to Rehab none identified  -AE     Row Name 10/17/22 1415          Living Environment    People in Home significant other  -AE     Row Name 10/17/22 [facility]          Home Main Entrance    Number of Stairs, Main Entrance none  -AE     Stair Railings, Main Entrance none  -AE     Row Name 10/17/22 1415          Stairs Within Home, Primary    Number of Stairs, Within Home, Primary none  -AE     Stair Railings, Within Home, Primary none  -AE     Row Name 10/17/22 1415          Cognition    Orientation Status (Cognition) oriented x 4  -AE     Row Name 10/17/22 1415          Safety Issues, Functional Mobility    Safety Issues Affecting Function (Mobility) awareness of need for assistance;sequencing abilities;insight into deficits/self-awareness  -AE     Impairments Affecting Function (Mobility) balance;coordination;strength  -AE           User Key  (r) = Recorded By, (t) = Taken By, (c) = Cosigned By    Initials Name Provider Type    AE Jayce Mitchell PT Physical Therapist                Mobility     Row Name 10/17/22 1417          Bed Mobility    Bed Mobility scooting/bridging;supine-sit  -AE     Scooting/Bridging [place] (Bed Mobility) supervision  -AE     Supine-Sit [place] (Bed Mobility) supervision  -AE     Assistive Device (Bed Mobility) bed rails;head of bed elevated  -AE     Comment, (Bed Mobility) VCs for hand placement and sequencing for improved independence.  -AE     Row Name 10/17/22 1417          Transfers    Comment, (Transfers) Pt demo no LOB and required no cues for sequencing of transfers.  -AE     Row Name 10/17/22 1417          Sit-Stand Transfer    Sit-Stand [place] (Transfers) supervision  -AE     Row Name 10/17/22 1417          Gait/Stairs (Locomotion)    [place] Level (Gait) contact guard  -AE     Distance in Feet (Gait) 450  -AE     Deviations/Abnormal Patterns (Gait) bilateral deviations;base of support, narrow;trent decreased;stride length decreased  -AE     Bilateral Gait Deviations heel strike decreased  -AE     Comment, (Gait/Stairs) Pt demo step through gait pattern with slowed trent, decreased step length, and narrow AYDEE. Pt demo multiple near LOB throughout session that pt was able to self correct. Pt reports she has always had decreased balance. Pt demo good safety awareness but requires education on maintaining balance.  -AE           User Key  (r) = Recorded By, (t) = Taken By, (c) = Cosigned By    Initials Name Provider Type    AE Jayce Mitchell, EVAN Physical Therapist               Obj/Interventions     Row Name 10/17/22 1422          Range of Motion Comprehensive    General Range of Motion bilateral lower extremity ROM WFL  -AE     Row Name 10/17/22 1422          Strength Comprehensive (MMT)    General Manual Muscle Testing (MMT) Assessment lower extremity strength deficits identified  -AE     Comment, General Manual Muscle Testing (MMT) Assessment BLE grossly 4-/5; generalized weakness  -AE     Row Name 10/17/22 1420           Balance    Balance Assessment sitting static balance;sitting dynamic balance;sit to stand dynamic balance;standing static balance;standing dynamic balance  -AE     Static Sitting Balance independent  -AE     Dynamic Sitting Balance independent  -AE     Position, Sitting Balance unsupported;sitting edge of bed  -AE     Sit to Stand Dynamic Balance supervision  -AE     Static Standing Balance contact guard  -AE     Dynamic Standing Balance contact guard  -AE     Position/Device Used, Standing Balance unsupported  -AE     Balance Interventions standing;dynamic;static;narrowed base of support;vision occluded activity;combined head and eye movements;eyes closed during activity  -AE     Comment, Balance Pt completed balance screening, demo decreased balance with narrowed AYDEE and with eyes closed.  -AE     Row Name 10/17/22 1422          Sensory Assessment (Somatosensory)    Sensory Assessment (Somatosensory) LE sensation intact  -AE           User Key  (r) = Recorded By, (t) = Taken By, (c) = Cosigned By    Initials Name Provider Type    AE Jayce Mitchell, PT Physical Therapist               Goals/Plan     Row Name 10/17/22 1421          Bed Mobility Goal 1 (PT)    Activity/Assistive Device (Bed Mobility Goal 1, PT) sit to supine/supine to sit  -AE     [place] Level/Cues Needed (Bed Mobility Goal 1, PT) independent  -AE     Time Frame (Bed Mobility Goal 1, PT) short term goal (STG);5 days  -AE     Progress/Outcomes (Bed Mobility Goal 1, PT) goal ongoing  -AE     Row Name 10/17/22 1421          Transfer Goal 1 (PT)    Activity/Assistive Device (Transfer Goal 1, PT) sit-to-stand/stand-to-sit;bed-to-chair/chair-to-bed  -AE     [place] Level/Cues Needed (Transfer Goal 1, PT) independent  -AE     Time Frame (Transfer Goal 1, PT) short term goal (STG);5 days  -AE     Progress/Outcome (Transfer Goal 1, PT) goal ongoing  -AE     Row Name 10/17/22 1428          Gait Training Goal 1 (PT)    Activity/Assistive Device  (Gait Training Goal 1, PT) gait (walking locomotion)  -AE     [place] Level (Gait Training Goal 1, PT) independent  -AE     Distance (Gait Training Goal 1, PT) 600ft  -AE     Time Frame (Gait Training Goal 1, PT) short term goal (STG);5 days  -AE     Progress/Outcome (Gait Training Goal 1, PT) goal ongoing  -AE     Row Name 10/17/22 9288          Therapy Assessment/Plan (PT)    Planned Therapy Interventions (PT) bed mobility training;balance training;home exercise program;gait training;patient/family education;postural re-education;ROM (range of motion);strengthening;transfer training  -AE           User Key  (r) = Recorded By, (t) = Taken By, (c) = Cosigned By    Initials Name Provider Type    AE Jayce Mitchell, PT Physical Therapist               Clinical Impression     Row Name 10/17/22 8064          Pain    Pretreatment Pain Rating 0/10 - no pain  -AE     Posttreatment Pain Rating 0/10 - no pain  -AE     Row Name 10/17/22 0688          Plan of Care Review    Plan of Care Reviewed With patient  -AE     Progress no change  -AE     Outcome Evaluation PT eval completed. Pt presents with decreased balance with mobility and generalized weakness. Pt ambulated 450ft with CGA, unsupported. Pt demo decreased balance throughout session but was able to self correct. Pt also completed balance screening to test pt balance. Recommend continued skilled IP PT interventions d/t decreased balance. Recommend D/C home with assist and OP PT services.  -AE     Row Name 10/17/22 1367          Therapy Assessment/Plan (PT)    Patient/Family Therapy Goals Statement (PT) Return home  -AE     Rehab Potential (PT) good, to achieve stated therapy goals  -AE     Criteria for Skilled Interventions Met (PT) yes  -AE     Therapy Frequency (PT) daily  -AE     Row Name 10/17/22 8073          Vital Signs    Pre Systolic BP Rehab 148  -AE     Pre Treatment Diastolic   -AE     Pretreatment Heart Rate (beats/min) 100  -AE     Posttreatment  Heart Rate (beats/min) 94  -AE     Pre SpO2 (%) 96  -AE     O2 Delivery Pre Treatment room air  -AE     O2 Delivery Intra Treatment room air  -AE     Post SpO2 (%) 98  -AE     O2 Delivery Post Treatment room air  -AE     Pre Patient Position Supine  -AE     Intra Patient Position Standing  -AE     Post Patient Position Sitting  -AE     Row Name 10/17/22 1425          Positioning and Restraints    Pre-Treatment Position in bed  -AE     Post Treatment Position chair  -AE     In Chair notified nsg;reclined;call light within reach;encouraged to call for assist;waffle cushion;legs elevated  -AE           User Key  (r) = Recorded By, (t) = Taken By, (c) = Cosigned By    Initials Name Provider Type    Jayce Dunlap PT Physical Therapist               Outcome Measures     Row Name 10/17/22 1430 10/17/22 1030       How much help from another person do you currently need...    Turning from your back to your side while in flat bed without using bedrails? 4  -AE 4  -KT    Moving from lying on back to sitting on the side of a flat bed without bedrails? 4  -AE 4  -KT    Moving to and from a bed to a chair (including a wheelchair)? 4  -AE 4  -KT    Standing up from a chair using your arms (e.g., wheelchair, bedside chair)? 4  -AE 4  -KT    Climbing 3-5 steps with a railing? 3  -AE 3  -KT    To walk in hospital room? 3  -AE 3  -KT    AM-PAC 6 Clicks Score (PT) 22  -AE 22  -KT    Highest level of mobility 7 --> Walked 25 feet or more  -AE 7 --> Walked 25 feet or more  -KT    Row Name 10/17/22 1430          Functional Assessment    Outcome Measure Options AM-PAC 6 Clicks Basic Mobility (PT)  -AE           User Key  (r) = Recorded By, (t) = Taken By, (c) = Cosigned By    Initials Name Provider Type    Melissa Rivers, RN Registered Nurse    Jayce Dunlap, PT Physical Therapist                             Physical Therapy Education     Title: PT OT SLP Therapies (In Progress)     Topic: Physical Therapy (In Progress)      Point: Mobility training (Done)     Learning Progress Summary           Patient Acceptance, E, VU by AE at 10/17/2022 1350                   Point: Home exercise program (Not Started)     Learner Progress:  Not documented in this visit.          Point: Body mechanics (Done)     Learning Progress Summary           Patient Acceptance, E, VU by AE at 10/17/2022 1350                   Point: Precautions (Done)     Learning Progress Summary           Patient Acceptance, E, VU by AE at 10/17/2022 1350                               User Key     Initials Effective Dates Name Provider Type Discipline    AE 09/21/21 -  Jayce Mitchell, PT Physical Therapist PT              PT Recommendation and Plan  Planned Therapy Interventions (PT): bed mobility training, balance training, home exercise program, gait training, patient/family education, postural re-education, ROM (range of motion), strengthening, transfer training  Plan of Care Reviewed With: patient  Progress: no change  Outcome Evaluation: PT eval completed. Pt presents with decreased balance with mobility and generalized weakness. Pt ambulated 450ft with CGA, unsupported. Pt demo decreased balance throughout session but was able to self correct. Pt also completed balance screening to test pt balance. Recommend continued skilled IP PT interventions d/t decreased balance. Recommend D/C home with assist and OP PT services.     Time Calculation:    PT Charges     Row Name 10/17/22 1432             Time Calculation    Start Time 1350  -AE      PT Received On 10/17/22  -AE      PT Goal Re-Cert Due Date 10/27/22  -AE         Time Calculation- PT    Total Timed Code Minutes- PT 9 minute(s)  -AE         Timed Charges    39131 - PT Therapeutic Activity Minutes 9  -AE         Untimed Charges    PT Eval/Re-eval Minutes 31  -AE         Total Minutes    Timed Charges Total Minutes 9  -AE      Untimed Charges Total Minutes 31  -AE       Total Minutes 40  -AE            User Key   (r) = Recorded By, (t) = Taken By, (c) = Cosigned By    Initials Name Provider Type    AE Jayce Mitchell, PT Physical Therapist              Therapy Charges for Today     Code Description Service Date Service Provider Modifiers Qty    62118686862 HC PT THERAPEUTIC ACT EA 15 MIN 10/17/2022 Jayce Mitchell, PT GP 1    10253420475 HC PT EVAL LOW COMPLEXITY 3 10/17/2022 Jayce Mitchell, PT GP 1          PT G-Codes  Outcome Measure Options: AM-PAC 6 Clicks Basic Mobility (PT)  AM-PAC 6 Clicks Score (PT): 22    Jayce Mitchell PT  10/17/2022

## 2022-10-17 NOTE — ANESTHESIA PREPROCEDURE EVALUATION
Anesthesia Evaluation     Patient summary reviewed and Nursing notes reviewed                Airway   Mallampati: I  TM distance: >3 FB  Neck ROM: full  No difficulty expected  Dental - normal exam     Pulmonary - normal exam   (+) a smoker Current,   Cardiovascular - negative cardio ROS and normal exam        Neuro/Psych  (+) seizures, headaches, psychiatric history Depression,    GI/Hepatic/Renal/Endo - negative ROS     ROS Comment: SEVERE MALNUTITION  INTRACTABLE NAUSEA/VOMITING    Musculoskeletal (-) negative ROS    Abdominal  - normal exam    Bowel sounds: normal.   Substance History - negative use     OB/GYN negative ob/gyn ROS         Other                        Anesthesia Plan    ASA 3     general     (PROPOFOL)  intravenous induction     Anesthetic plan, risks, benefits, and alternatives have been provided, discussed and informed consent has been obtained with: patient.    Plan discussed with CRNA.        CODE STATUS:    Code Status (Patient has no pulse and is not breathing): CPR (Attempt to Resuscitate)  Medical Interventions (Patient has pulse or is breathing): Full Support

## 2022-10-17 NOTE — BRIEF OP NOTE
ESOPHAGOGASTRODUODENOSCOPY  Progress Note    Lucinda Koch  10/17/2022    EGD shows mild gastritis.  Biopsy taken for H. pylori.  Nothing seen to explain patient's nausea and vomiting.    Mark I. Brunner, MD     Date: 10/17/2022  Time: 09:31 EDT

## 2022-10-17 NOTE — ANESTHESIA POSTPROCEDURE EVALUATION
Patient: Lucinda Koch    Procedure Summary     Date: 10/17/22 Room / [facility]:  [facility]    Anesthesia Start: 0918 Anesthesia Stop: 0935    Procedure: ESOPHAGOGASTRODUODENOSCOPY Diagnosis:       Intractable vomiting      (Intractable vomiting [R11.10])    Surgeons: Brunner, Mark I, MD Provider: Keon Caicedo MD    Anesthesia Type: general ASA Status: 3          Anesthesia Type: general    Vitals  No vitals data found for the desired time range.          Post Anesthesia Care and Evaluation    Patient location during evaluation: PACU  Patient participation: complete - patient participated  Level of consciousness: sleepy but conscious  Pain management: adequate    Airway patency: patent  Anesthetic complications: No anesthetic complications  PONV Status: none  Cardiovascular status: hemodynamically stable and acceptable  Respiratory status: nonlabored ventilation, acceptable and room air  Hydration status: acceptable    Comments: 106/78  HR 82  Sats 95% on RA

## 2022-10-17 NOTE — CONSULTS
"                  Clinical Nutrition     Nutrition Assessment  Reason for Visit:   Identified at risk by screening criteria, MST score 2+, Malnutrition Severity Assessment      Patient Name: Lucinda Koch  YOB: 1970  MRN: [id number]  Date of Encounter: 10/16/22 21:37 EDT  Admission date: 10/15/2022      Comments:  Need to contact family for further data. Per physical exam and intake hx pt qualifies for  Malnutrition dx. Need to confirm amt and timeframe of wt loss to determine malnutrition type/category.   Note pt now rpts wt of 80 lbs. Per EMR wt of 88 lbs in 2017.    Admission Diagnosis    Intractable vomiting [R11.10]     Hospital Problem List    Intractable vomiting    Severe malnutrition (HCC)    Seizure disorder (HCC)    Hypokalemia    Hyponatremia      Other Applicable:  migraine    Applicable Interval History:  Plan for EGD    Applicable PMH/PSxH:     PMH: She  has a past medical history of Depression, Migraine, and Seizures (HCC).   PSxH: She  has no past surgical history on file.         Diet/Nutrition Related History:     Pt allows unable to eat normally for last 6 mo w 40 lb wt loss.       Labs reviewed   Yes  Mg and K+ replaced  Results from last 7 days   Lab Units 10/16/22  1213 10/16/22  0640 10/16/22  0102 10/15/22  1038   GLUCOSE mg/dL  --  108*  --  104*   BUN mg/dL  --  4*  --  6   CREATININE mg/dL  --  0.47*  --  0.53*   SODIUM mmol/L  --  133*  --  133*   CHLORIDE mmol/L  --  102  --  91*   POTASSIUM mmol/L 4.6 5.0 3.4* 2.5*   MAGNESIUM mg/dL  --  2.4  --  1.3*   ALT (SGPT) U/L  --  45*  --  36*     Results from last 7 days   Lab Units 10/16/22  0640 10/15/22  1038   ALBUMIN g/dL 2.90* 3.00*         Lab 10/15/22  1000   LACTATE 1.8              No results found for: HGBA1C    Medications reviewed   Yes  Keppra, Protonix, Multi vit min    Intake/Ouptut 24 hrs reviewed   Yes    Anthropometrics     Admission Height 157.5 cm (62\") Documented at 10/15/2022 0839   Admission Weight " "36.3 kg (80 lb) Documented at 10/15/2022 0839       Height: 157.5 cm (62\")    Last filed wt: Weight: 36.3 kg (80 lb) (10/15/22 0839)  Weight Method: Stated    BMI: BMI (Calculated): 14.6  Underweight:<18.5kg/m2    Ideal Body Weight (IBW) (kg): 50.43      Weight Change   UBW: per pt was 40 lbs heavier than current stated wt. Only wt available in EMR 88 lbs in 2017   Would be 8 lb loss 9% of body wt ? over what time frame.    Weight change:  % wt change:   Time frame of weight loss:      Nutrition Focused Physical Exam  Date: 10/16     Subcutaneous fat:  Orbital Mild   Buccal Mild   Tricep Mild   Ribs- thoracic and lumbar region, lower back, midaxillary line Mild     Muscle:  Temple/temporalis Moderate   Clavicle/acromion- pectoralis major, deltoid, trapezius Moderate   Shoulder- deltoid Moderate   Scapular- trapezius, latissimus dorsi Moderate   Interosseous- dorsal hand Mild   Thigh- quadriceps Moderate   Calf- gastrocnemius Moderate   Knee                                                          Moderate      Current Nutrition Prescription     PO: Diet Regular  NPO Diet NPO Type: Sips with Meds      Intake:  25% x 3 meals      Nutrition Diagnosis     10/16  Problem Malnutrition  type pending further data   Etiology Alt GI fx   Signs/Symptoms inake hx wasting   Status:  10/16  Problem Inadequate oral intake   Etiology nausea   Signs/Symptoms 25% x 3 meals and report poor PTA   Status:     Goal:   General: Nutrition to support treatment  PO: Increase intake  Additional goals:      Nutrition Intervention     Follow treatment progress, Care plan reviewed, Advise alternate selection, Menu provided, Supplement offered/refused      Monitoring/Evaluation:   Per protocol, PO intake, Pertinent labs, Weight, GI status, Symptoms        Linda Contreras RD,   Time Spent: 30 min      "

## 2022-10-17 NOTE — PLAN OF CARE
Goal Outcome Evaluation:  Plan of Care Reviewed With: patient        Progress: no change  Outcome Evaluation: PT eval completed. Pt presents with decreased balance with mobility and generalized weakness. Pt ambulated 450ft with CGA, unsupported. Pt demo decreased balance throughout session but was able to self correct. Pt also completed balance screening to test pt balance. Recommend continued skilled IP PT interventions d/t decreased balance. Recommend D/C home with assist and OP PT services.

## 2022-10-17 NOTE — PAYOR COMM NOTE
"Harley Koch (52 y.o. Female)     Megha GUEVARA UR   phone: [phone]  fax: [phone]      Date of Birth   1970    Social Security Number       Address   [address] [place] [address]    Home Phone   [phone]    MRN   [id number]       Zoroastrian   Unknown    Marital Status   Single                            Admission Date   10/15/22    Admission Type   Emergency    Admitting Provider   Shannan Graves MD    Attending Provider   Shannan Graves MD    Department, Room/Bed   [facility] 2F, S218/1       Discharge Date       Discharge Disposition       Discharge Destination                               Attending Provider: Shannan Graves MD    Allergies: Aspirin    Isolation: None   Infection: None   Code Status: CPR    Ht: 157.5 cm (62\")   Wt: 36.3 kg (80 lb)    Admission Cmt: None   Principal Problem: Intractable vomiting [R11.10]                 Active Insurance as of 10/15/2022     Primary Coverage     Payor Plan Insurance Group Employer/Plan Group    [employer] [place] [employer] [place]ICA[place]      Payor Plan Address Payor Plan Phone Number Payor Plan Fax Number Effective Dates    [address] [address] [phone]  10/18/2017 - None Entered    [facility] [address]       Subscriber Name Subscriber Birth Date Member ID       HARLEY KOCH 1970 [id number]                 Emergency Contacts      (Rel.) Home Phone Work Phone Mobile Phone    GHULAM DENISE (Daughter) -- -- [phone]    NISHA GARCIA (Significant Other) -- -- [phone]               History & Physical      Kevin Carroll MD at 10/15/22 [facility]              [facility] Medicine Services  HISTORY AND PHYSICAL    Patient Name: Harley Koch  : 1970  MRN: [id number]  Primary Care Physician: Jese Tran MD  Date of admission: 10/15/2022      Subjective    Subjective     Chief Complaint:  Nausea and vomiting    HPI:  Harley Koch is a 52 y.o. female here with " nausea/vomiting/weight loss x 6 months. Six months ago she noted progressive nausea with near daily vomiting, sometimes 2-3x daily. Sometimes associated with food but not always. Notes abdominal cramping with emesis otherwise no pain. Denies diarrhea. Denies blood in stool or hematemesis. Occasionally describes bilious vomiting. No f/c/sweats. Notes occasional globus sensation when swallowing, but otherwise no difficulty. Notes 40 pound weight loss. Has been seen in multiple EDs without diagnosis. Has tired zofran and reglan and these exacerbated her symptoms. She reports being told that her gallbladder was normal       Review of Systems   Constitutional: Positive for activity change, appetite change and fatigue.   HENT: Negative.    Respiratory: Negative.    Cardiovascular: Negative.    Gastrointestinal: Positive for nausea and vomiting.        Heartburn/reflux   Endocrine: Negative.    Genitourinary: Negative.    Musculoskeletal: Negative.    Skin: Positive for rash.   Neurological: Positive for weakness and numbness.        All other systems reviewed and are negative.     Personal History     Past Medical History:   Diagnosis Date   • Depression    • Migraine    • Seizures (HCC)              History reviewed. No pertinent surgical history.    Family History: Her family history includes Heart disease in her father and mother; Hypertension in her father and mother. Otherwise pertinent FHx was reviewed and unremarkable.     Social History:  reports that she has been smoking cigarettes. She has been smoking an average of 2 packs per day. She has never used smokeless tobacco. Drug use questions deferred to the physician. She reports that she does not drink alcohol.  Social History     Social History Narrative   • Not on file       Medications:  Available home medication information reviewed.  (Not in a hospital admission)      Allergies   Allergen Reactions   • Aspirin        Objective    Objective     Vital Signs:    Temp:  [98.4 °F (36.9 °C)] 98.4 °F (36.9 °C)  Heart Rate:  [] 92  Resp:  [16] 16  BP: (126-140)/() 140/96       Physical Exam   NAD, alert and oriented  OP clear, MMM  Neck supple  No LAD  RRR  CTAB  +BS, ND, NT, soft  HARRELL  Normal affect  No c/c/e    Result Review:  I have personally reviewed the results from the time of this admission to 10/15/2022 12:58 EDT and agree with these findings:  []  Laboratory list / accordion  []  Microbiology  []  Radiology  []  EKG/Telemetry   []  Cardiology/Vascular   []  Pathology  []  Old records  []  Other:  Most notable findings include: CT reviewed, low K, low protein        LAB RESULTS:      Lab 10/15/22  1000   WBC 7.64   HEMOGLOBIN 14.2   HEMATOCRIT 40.0   PLATELETS 273   NEUTROS ABS 5.49   IMMATURE GRANS (ABS) 0.04   LYMPHS ABS 1.51   MONOS ABS 0.57   EOS ABS 0.02   MCV 92.2   LACTATE 1.8         Lab 10/15/22  1038   SODIUM 133*   POTASSIUM 2.5*   CHLORIDE 91*   CO2 30.0*   ANION GAP 12.0   BUN 6   CREATININE 0.53*   EGFR 112.1   GLUCOSE 104*   CALCIUM 8.7   MAGNESIUM 1.3*         Lab 10/15/22  1038   TOTAL PROTEIN 5.1*   ALBUMIN 3.00*   GLOBULIN 2.1   ALT (SGPT) 36*   AST (SGOT) 49*   BILIRUBIN 0.5   ALK PHOS 71   LIPASE 14                     UA    Urinalysis 10/15/22 10/15/22    1000 1000   Squamous Epithelial Cells, UA  3-6 (A)   Specific Gravity, UA 1.014    Ketones, UA 15 mg/dL (1+) (A)    Blood, UA Negative    Leukocytes, UA Negative    Nitrite, UA Negative    RBC, UA  0-2   WBC, UA  6-12 (A)   Bacteria, UA  None Seen   (A) Abnormal value              Microbiology Results (last 10 days)     ** No results found for the last 240 hours. **          CT Abdomen Pelvis With Contrast    Result Date: 10/15/2022  DATE OF EXAM: 10/15/2022 10:17 AM  PROCEDURE: CT ABDOMEN PELVIS W CONTRAST-  INDICATIONS: cyclical vomiting  COMPARISON: No comparisons available.  TECHNIQUE: Routine transaxial slices were obtained through the abdomen and pelvis after the intravenous  administration of 100 mL of Isovue 300. Reconstructed coronal and sagittal images were also obtained. Automated exposure control and iterative construction methods were used.  The radiation dose reduction device was turned on for each scan per the ALARA (As Low as Reasonably Achievable) protocol.  FINDINGS: The lung bases are clear.  There is focal steatosis within the left hepatic lobe near the falciform ligament. The gallbladder, adrenal glands, right kidney, spleen, and pancreas are unremarkable. There is left renal cortical scarring.  The stomach appears normal. The small bowel appears normal in caliber and configuration. The colon appears normal. The appendix is not well-visualized. There is no ascites or loculated collection. No abnormally enlarged lymph nodes are identified.  The rectum and urinary bladder are unremarkable. The uterus is surgically absent.  No aggressive osseous lesions are identified.      Impression: No acute process identified within abdomen/pelvis.  This report was finalized on 10/15/2022 10:59 AM by Donny Pozo MD.            Assessment & Plan   Assessment & Plan     Active Hospital Problems    Diagnosis  POA   • **Intractable vomiting [R11.10]  Yes   • Severe malnutrition (HCC) [E43]  Unknown     Intractable N/V  FTT  Severe protein malnutrition  Neuropathy  -IVF  -replace electrolytes prn  -check B12/folate  -check TSH  -PPI  -prn nausea meds  -consult GI    Hx of Migraines on topamax    Hx of seizures on Keppra    Hx of anxiety on prozac    DVT prophylaxis:  SCDS      CODE STATUS:  Full/CPR  Code Status and Medical Interventions:   Ordered at: 10/15/22 1221     Code Status (Patient has no pulse and is not breathing):    CPR (Attempt to Resuscitate)     Medical Interventions (Patient has pulse or is breathing):    Full Support         Kevin Carroll MD  10/15/22      Electronically signed by Kevin Carroll MD at 10/15/22 1303       Lab Results (last 48 hours)     Procedure  Component Value Units Date/Time    Tissue Pathology Exam [[id number]] Collected: 10/17/22 0929    Specimen: Tissue from Gastric, Antrum Updated: 10/17/22 1045    Hepatic Function Panel [[id number]]  (Abnormal) Collected: 10/17/22 0544    Specimen: Blood Updated: 10/17/22 0957     Total Protein 4.4 g/dL      Albumin 2.70 g/dL      ALT (SGPT) 66 U/L      AST (SGOT) 77 U/L      Alkaline Phosphatase 72 U/L      Total Bilirubin 0.3 mg/dL      Bilirubin, Direct <0.2 mg/dL      Bilirubin, Indirect --     Comment: Unable to calculate       Basic Metabolic Panel [[id number]]  (Abnormal) Collected: 10/17/22 0544    Specimen: Blood Updated: 10/17/22 0827     Glucose 95 mg/dL      BUN 2 mg/dL      Creatinine 0.42 mg/dL      Sodium 138 mmol/L      Potassium 4.9 mmol/L      Chloride 107 mmol/L      CO2 24.0 mmol/L      Calcium 8.3 mg/dL      BUN/Creatinine Ratio 4.8     Anion Gap 7.0 mmol/L      eGFR 118.6 mL/min/1.73      Comment: National Kidney Foundation and American Society of Nephrology (ASN) Task Force recommended calculation based on the Chronic Kidney Disease Epidemiology Collaboration (CKD-EPI) equation refit without adjustment for race.       Narrative:      GFR Normal >60  Chronic Kidney Disease <60  Kidney Failure <15      CBC (No Diff) [[id number]]  (Abnormal) Collected: 10/17/22 0544    Specimen: Blood Updated: 10/17/22 0754     WBC 5.57 10*3/mm3      RBC 3.49 10*6/mm3      Hemoglobin 11.6 g/dL      Hematocrit 35.4 %      .4 fL      MCH 33.2 pg      MCHC 32.8 g/dL      RDW 13.8 %      RDW-SD 51.4 fl      MPV 10.8 fL      Platelets 232 10*3/mm3     Potassium [[id number]]  (Normal) Collected: 10/16/22 1213    Specimen: Blood Updated: 10/16/22 1244     Potassium 4.6 mmol/L     Hepatitis Panel, Acute [[id number]]  (Normal) Collected: 10/16/22 0640    Specimen: Blood Updated: 10/16/22 1040     Hepatitis B Surface Ag Non-Reactive     Hep A IgM Non-Reactive     Hep B C IgM Non-Reactive     Hepatitis C Ab Non-Reactive     Narrative:      Results may be falsely decreased if patient taking Biotin.     Comprehensive Metabolic Panel [[id number]]  (Abnormal) Collected: 10/16/22 0640    Specimen: Blood Updated: 10/16/22 0742     Glucose 108 mg/dL      BUN 4 mg/dL      Creatinine 0.47 mg/dL      Sodium 133 mmol/L      Potassium 5.0 mmol/L      Chloride 102 mmol/L      CO2 25.0 mmol/L      Calcium 7.8 mg/dL      Total Protein 4.8 g/dL      Albumin 2.90 g/dL      ALT (SGPT) 45 U/L      AST (SGOT) 89 U/L      Alkaline Phosphatase 74 U/L      Total Bilirubin 0.4 mg/dL      Globulin 1.9 gm/dL      Comment: Calculated Result        A/G Ratio 1.5 g/dL      BUN/Creatinine Ratio 8.5     Anion Gap 6.0 mmol/L      eGFR 115.4 mL/min/1.73      Comment: National Kidney Foundation and American Society of Nephrology (ASN) Task Force recommended calculation based on the Chronic Kidney Disease Epidemiology Collaboration (CKD-EPI) equation refit without adjustment for race.       Narrative:      GFR Normal >60  Chronic Kidney Disease <60  Kidney Failure <15      Magnesium [[id number]]  (Normal) Collected: 10/16/22 0640    Specimen: Blood Updated: 10/16/22 0742     Magnesium 2.4 mg/dL     CBC Auto Differential [[id number]]  (Abnormal) Collected: 10/16/22 0640    Specimen: Blood Updated: 10/16/22 0727     WBC 7.63 10*3/mm3      RBC 3.72 10*6/mm3      Hemoglobin 12.1 g/dL      Hematocrit 34.7 %      MCV 93.3 fL      MCH 32.5 pg      MCHC 34.9 g/dL      RDW 13.6 %      RDW-SD 46.7 fl      MPV 9.7 fL      Platelets 268 10*3/mm3      Neutrophil % 58.1 %      Lymphocyte % 33.6 %      Monocyte % 7.1 %      Eosinophil % 0.8 %      Basophil % 0.3 %      Immature Grans % 0.1 %      Neutrophils, Absolute 4.44 10*3/mm3      Lymphocytes, Absolute 2.56 10*3/mm3      Monocytes, Absolute 0.54 10*3/mm3      Eosinophils, Absolute 0.06 10*3/mm3      Basophils, Absolute 0.02 10*3/mm3      Immature Grans, Absolute 0.01 10*3/mm3      nRBC 0.0 /100 WBC     Potassium [[id number]]   "(Abnormal) Collected: 10/16/22 0102    Specimen: Blood Updated: 10/16/22 0147     Potassium 3.4 mmol/L     Folate [[id number]]  (Normal) Collected: 10/15/22 1630    Specimen: Blood Updated: 10/15/22 2121     Folate 16.60 ng/mL     Narrative:      Results may be falsely increased if patient taking Biotin.      Vitamin B12 [[id number]]  (Normal) Collected: 10/15/22 1630    Specimen: Blood Updated: 10/15/22 2121     Vitamin B-12 335 pg/mL     Narrative:      Results may be falsely increased if patient taking Biotin.            Imaging Results (Last 48 Hours)     ** No results found for the last 48 hours. **           Physician Progress Notes (last 48 hours)      Brunner, Mark I, MD at 10/16/22 1708          GI Daily Progress Note  Subjective:    Chief Complaint:  F/u N/V    Nausea improved. No further emesis. Eating 25% of meals.    Objective:    /92 (BP Location: Left arm, Patient Position: Lying)   Pulse 86   Temp 98 °F (36.7 °C) (Oral)   Resp 16   Ht 157.5 cm (62\")   Wt 36.3 kg (80 lb)   SpO2 97%   BMI 14.63 kg/m²     Physical Exam  Constitutional:       General: She is not in acute distress.     Appearance: She is ill-appearing. She is not toxic-appearing or diaphoretic.      Comments: Underweight and chronically ill.   HENT:      Head: Normocephalic.      Nose: Nose normal. No congestion.      Mouth/Throat:      Mouth: Mucous membranes are moist.      Pharynx: No oropharyngeal exudate.   Eyes:      General: No scleral icterus.     Conjunctiva/sclera: Conjunctivae normal.   Cardiovascular:      Rate and Rhythm: Normal rate.      Pulses: Normal pulses.   Pulmonary:      Effort: Pulmonary effort is normal. No respiratory distress.      Breath sounds: No stridor. No wheezing or rales.   Abdominal:      General: Bowel sounds are normal. There is no distension.      Palpations: Abdomen is soft.      Tenderness: There is no abdominal tenderness. There is no guarding.   Genitourinary:     Comments: " deferred  Musculoskeletal:         General: Normal range of motion.      Cervical back: Normal range of motion.      Right lower leg: No edema.      Left lower leg: No edema.   Skin:     General: Skin is warm and dry.      Capillary Refill: Capillary refill takes less than 2 seconds.      Coloration: Skin is not jaundiced or pale.      Findings: No erythema or rash.   Neurological:      General: No focal deficit present.      Mental Status: She is alert and oriented to person, place, and time.   Psychiatric:         Mood and Affect: Mood normal.         Behavior: Behavior normal.       Lab  Lab Results   Component Value Date    WBC 7.63 10/16/2022    HGB 12.1 10/16/2022    HGB 14.2 10/15/2022    HGB 13.9 01/07/2020    MCV 93.3 10/16/2022     10/16/2022       Lab Results   Component Value Date    GLUCOSE 108 (H) 10/16/2022    BUN 4 (L) 10/16/2022    CREATININE 0.47 (L) 10/16/2022    BCR 8.5 10/16/2022     (L) 10/16/2022    K 4.6 10/16/2022    CO2 25.0 10/16/2022    CALCIUM 7.8 (L) 10/16/2022    ALBUMIN 2.90 (L) 10/16/2022    ALKPHOS 74 10/16/2022    BILITOT 0.4 10/16/2022    ALT 45 (H) 10/16/2022    AST 89 (H) 10/16/2022       Assessment:    1.  Chronic severe nausea and vomiting, improving.  At risk for SMA syndrome.  At risk for peptic ulcer disease (NSAID use).  2.  Hyponatremic, hypokalemic, metabolic alkalosis consistent with significant vomiting. Electrolytes improved.  3.  Weight loss.  4.  Severe protein calorie malnutrition and underweight.  5.  Borderline elevated transaminases, worsening. Focal fatty liver on CT.  Suspect related to severe malnutrition.  6.  Seizure disorder.  Patient reports good control of seizures on Keppra since 2004.    Plan:    >> EGD tomorrow.  >> If transaminases worse tomorrow, will proceed with full hepatitis work-up.    Mark I. Brunner, MD  10/16/22  17:08 EDT      Electronically signed by Brunner, Mark I, MD at 10/16/22 6000     Shannan Graves MD at 10/16/22 1610               [facility] Medicine Services  PROGRESS NOTE    Patient Name: Lucinda Koch  : 1970  MRN: [id number]    Date of Admission: 10/15/2022  Primary Care Physician: Jese Tran MD    Subjective   Subjective     CC:  F/U N/V, weight loss    HPI:  Seen this morning, feels a little better. Was able to keep food and drink down this morning. Complains of some numbness and pain in her toes, has been going on for months.     ROS:  Gen-no fevers, no chills  CV-no chest pain, no palpitations  Resp-no cough, no dyspnea  GI-no N/V currently, no abd pain    All other systems reviewed and negative except any additional pertinent positives and negatives as discussed in HPI.    Objective   Objective     Vital Signs:   Temp:  [97.5 °F (36.4 °C)-98.9 °F (37.2 °C)] 98.2 °F (36.8 °C)  Heart Rate:  [82-98] 91  Resp:  [16-18] 18  BP: (128-144)/(88-98) 128/92     Physical Exam:  Gen-no acute distress, very thin   HENT-NCAT, mucous membranes moist  CV-RRR, S1 S2 normal, no m/r/g  Resp-CTAB, no wheezes or rales  Abd-soft, NT, ND, +BS  Ext-no edema  Neuro-A&Ox3, no focal deficits  Skin-no rashes  Psych-appropriate mood    Results Reviewed:  LAB RESULTS:      Lab 10/16/22  0640 10/15/22  1000   WBC 7.63 7.64   HEMOGLOBIN 12.1 14.2   HEMATOCRIT 34.7 40.0   PLATELETS 268 273   NEUTROS ABS 4.44 5.49   IMMATURE GRANS (ABS) 0.01 0.04   LYMPHS ABS 2.56 1.51   MONOS ABS 0.54 0.57   EOS ABS 0.06 0.02   MCV 93.3 92.2   LACTATE  --  1.8         Lab 10/16/22  0640 10/16/22  0102 10/15/22  1038   SODIUM 133*  --  133*   POTASSIUM 5.0 3.4* 2.5*   CHLORIDE 102  --  91*   CO2 25.0  --  30.0*   ANION GAP 6.0  --  12.0   BUN 4*  --  6   CREATININE 0.47*  --  0.53*   EGFR 115.4  --  112.1   GLUCOSE 108*  --  104*   CALCIUM 7.8*  --  8.7   MAGNESIUM 2.4  --  1.3*   TSH  --   --  1.570         Lab 10/16/22  0640 10/15/22  1038   TOTAL PROTEIN 4.8* 5.1*   ALBUMIN 2.90* 3.00*   GLOBULIN 1.9 2.1   ALT (SGPT) 45*  36*   AST (SGOT) 89* 49*   BILIRUBIN 0.4 0.5   ALK PHOS 74 71   LIPASE  --  14                 Lab 10/15/22  1630   FOLATE 16.60   VITAMIN B 12 335         Brief Urine Lab Results  (Last result in the past 365 days)      Color   Clarity   Blood   Leuk Est   Nitrite   Protein   CREAT   Urine HCG        10/15/22 1000 Yellow   Cloudy   Negative   Negative   Negative   Negative                 Microbiology Results Abnormal     None          CT Abdomen Pelvis With Contrast    Result Date: 10/15/2022  DATE OF EXAM: 10/15/2022 10:17 AM  PROCEDURE: CT ABDOMEN PELVIS W CONTRAST-  INDICATIONS: cyclical vomiting  COMPARISON: No comparisons available.  TECHNIQUE: Routine transaxial slices were obtained through the abdomen and pelvis after the intravenous administration of 100 mL of Isovue 300. Reconstructed coronal and sagittal images were also obtained. Automated exposure control and iterative construction methods were used.  The radiation dose reduction device was turned on for each scan per the ALARA (As Low as Reasonably Achievable) protocol.  FINDINGS: The lung bases are clear.  There is focal steatosis within the left hepatic lobe near the falciform ligament. The gallbladder, adrenal glands, right kidney, spleen, and pancreas are unremarkable. There is left renal cortical scarring.  The stomach appears normal. The small bowel appears normal in caliber and configuration. The colon appears normal. The appendix is not well-visualized. There is no ascites or loculated collection. No abnormally enlarged lymph nodes are identified.  The rectum and urinary bladder are unremarkable. The uterus is surgically absent.  No aggressive osseous lesions are identified.      Impression: No acute process identified within abdomen/pelvis.  This report was finalized on 10/15/2022 10:59 AM by Donny Pozo MD.            I have reviewed the medications:  Scheduled Meds:FLUoxetine, 10 mg, Oral, Daily  levETIRAcetam, 750 mg, Oral,  BID  multivitamin, 1 tablet, Oral, Daily  pantoprazole, 40 mg, Oral, Q AM  sodium chloride, 10 mL, Intravenous, Q12H  topiramate, 100 mg, Oral, BID      Continuous Infusions:dextrose 5 % and sodium chloride 0.9 % with KCl 20 mEq, 75 mL/hr, Last Rate: 75 mL/hr (10/16/22 0305)      PRN Meds:.•  magnesium sulfate **OR** magnesium sulfate **OR** magnesium sulfate  •  ondansetron  •  potassium chloride **OR** potassium chloride **OR** potassium chloride  •  prochlorperazine **OR** prochlorperazine **OR** prochlorperazine  •  Sodium Chloride (PF)  •  sodium chloride    Assessment & Plan   Assessment & Plan     Active Hospital Problems    Diagnosis  POA   • **Intractable vomiting [R11.10]  Yes   • Seizure disorder (HCC) [G40.909]  Yes   • Hypokalemia [E87.6]  Yes   • Hyponatremia [E87.1]  Yes   • Severe malnutrition (HCC) [E43]  Unknown      Resolved Hospital Problems   No resolved problems to display.        Brief Hospital Course to date:  Lucinda Koch is a 52 y.o. female with hx of depression, anxiety, migraines, and seizures who presents due to 6 month history of progressively worsening nausea with almost daily vomiting. Also notes 40 lb weight loss. Has been seen in multiple ER's without a diagnosis. Admitted for further management.    *All problems are new to me today.    Intractable nausea and vomiting  Adult failure to thrive  Severe malnutrition  Neuropathy  --CT A/P unremarkable.  --TSH, folate normal. B12 on the lower end. 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  --GI consulted, planning for EGD tomorrow. Keep NPO after midnight.   --Continue PPI, PRN nausea meds.   --Nutrition consult. At risk for refeeding syndrome.    Hypokalemia  Hyponatremia, not clinically significant  Metabolic alkalosis  --Due to vomiting, volume depletion.  --Improved with fluids and electrolyte replacement.  --Monitor.     Mild transaminitis  --CT scan with focal fatty liver.  --Monitor.    Migraines  --Continue on Topamax. GI  recommends considering alternative agent as this medication can cause nausea and weight loss.     Seizure disorder  --Continue Keppra.    Depression/anxiety  --Continue Prozac.    Expected Discharge Location and Transportation: home  Expected Discharge Date: 10/18/22, TBD    DVT prophylaxis:  Mechanical DVT prophylaxis orders are present.     AM-PAC 6 Clicks Score (PT): 22 (10/16/22 0800)    CODE STATUS:   Code Status and Medical Interventions:   Ordered at: 10/15/22 1221     Code Status (Patient has no pulse and is not breathing):    CPR (Attempt to Resuscitate)     Medical Interventions (Patient has pulse or is breathing):    Full Support       Shannan Graves MD  10/16/22                Electronically signed by Shannan Graves MD at 10/16/22 1207          Consult Notes (last 48 hours)      Brunner, Mark I, MD at 10/15/22 1600      Consult Orders    1. Inpatient Gastroenterology Consult [[id number]] ordered by Kevin Carroll MD at 10/15/22 1303               [facility] Gastroenterology Consult    Referring Provider: Kevin Carroll MD    PCP: Jese Tran MD    Reason for Consultation: Intractable N/V    Chief complaint: nausea and vomiting.    History of present illness:    Lucinda Koch is a 52 y.o. female who is admitted with 6-month history of nausea and vomiting.  There is associated 40 pound weight loss.  Patient typically vomits after meals, but can vomit when not eating as well.  Denies hematemesis.   She was recently admitted to [facility] without finding an etiology for her symptoms.  Specifically, she had a negative work-up for biliary disease.  Reglan made her symptoms worse.  Zofran made her symptoms worse.  She is scheduled for an open access colonoscopy in [place] on October 20, 2022.  In the emergency room, CT scan of the abdomen is unremarkable.  She has mild transaminase elevation and multiple electrolyte abnormalities.  Denies history of anorexia/bulimia.  She does  frequently take ibuprofen.    Allergies:  Aspirin    Scheduled Meds:  FLUoxetine, 10 mg, Oral, Daily  levETIRAcetam, 750 mg, Oral, BID  multivitamin, 1 tablet, Oral, Daily  [START ON 10/16/2022] pantoprazole, 40 mg, Oral, Q AM  sodium chloride, 10 mL, Intravenous, Q12H  topiramate, 100 mg, Oral, BID         Infusions:  dextrose 5 % and sodium chloride 0.9 % with KCl 20 mEq, 75 mL/hr, Last Rate: 75 mL/hr (10/15/22 1514)        PRN Meds:  •  magnesium sulfate **OR** magnesium sulfate **OR** magnesium sulfate  •  ondansetron  •  potassium chloride **OR** potassium chloride **OR** potassium chloride  •  prochlorperazine **OR** prochlorperazine **OR** prochlorperazine  •  Sodium Chloride (PF)  •  sodium chloride    Home Meds:  Medications Prior to Admission   Medication Sig Dispense Refill Last Dose   • FLUoxetine (PROzac) 10 MG capsule Take 1 capsule by mouth Daily.      • hydrOXYzine (VISTARIL) 25 MG capsule take 1 capsule by mouth every 6 hours if needed for anxiety  0    • levETIRAcetam (KEPPRA) 750 MG tablet Take 1 tablet by mouth 2 (Two) Times a Day.      • sertraline (ZOLOFT) 100 MG tablet take 1 tablet by mouth once daily  0    • topiramate (TOPAMAX) 100 MG tablet Take 1 tablet by mouth 2 (Two) Times a Day.          ROS: Review of Systems   Constitutional: Positive for appetite change and unexpected weight change. Negative for activity change, chills, fatigue and fever.   HENT: Negative.  Negative for mouth sores, nosebleeds and trouble swallowing.    Eyes: Negative.    Respiratory: Negative.  Negative for cough, chest tightness, shortness of breath, wheezing and stridor.    Cardiovascular: Negative.  Negative for chest pain and leg swelling.   Gastrointestinal: Positive for constipation, nausea and vomiting. Negative for abdominal distention, abdominal pain, blood in stool and diarrhea.   Endocrine: Negative.    Genitourinary: Negative.  Negative for difficulty urinating and dyspareunia.   Musculoskeletal:  "Negative.    Skin: Negative.    Allergic/Immunologic: Negative.    Neurological: Positive for seizures. Negative for tremors, syncope, weakness and light-headedness.   Hematological: Negative.  Negative for adenopathy. Does not bruise/bleed easily.   Psychiatric/Behavioral: Negative.  Negative for agitation and behavioral problems.       PAST MED HX:  Past Medical History:   Diagnosis Date   • Depression    • Migraine    • Seizures (HCC)        PAST SURG HX:  History reviewed. No pertinent surgical history.    FAM HX:  Family History   Problem Relation Age of Onset   • Heart disease Mother    • Hypertension Mother    • Heart disease Father    • Hypertension Father        SOC HX:  Social History     Socioeconomic History   • Marital status: Single   Tobacco Use   • Smoking status: Every Day     Packs/day: 2.00     Types: Cigarettes   • Smokeless tobacco: Never   Substance and Sexual Activity   • Alcohol use: No   • Drug use: Defer   • Sexual activity: Defer       PHYSICAL EXAM  /94 (BP Location: Left arm, Patient Position: Lying)   Pulse 93   Temp 98.9 °F (37.2 °C) (Oral)   Resp 16   Ht 157.5 cm (62\")   Wt 36.3 kg (80 lb)   SpO2 97%   BMI 14.63 kg/m²   Wt Readings from Last 3 Encounters:   10/15/22 36.3 kg (80 lb)   10/18/17 39.9 kg (88 lb)   ,body mass index is 14.63 kg/m².  Physical Exam  Vitals and nursing note reviewed.   Constitutional:       General: She is not in acute distress.     Appearance: She is ill-appearing. She is not toxic-appearing or diaphoretic.      Comments: Appears chronically ill and underweight.   HENT:      Head: Normocephalic.      Nose: Nose normal. No congestion.      Mouth/Throat:      Mouth: Mucous membranes are moist.      Pharynx: No oropharyngeal exudate.   Eyes:      General: No scleral icterus.     Conjunctiva/sclera: Conjunctivae normal.   Cardiovascular:      Rate and Rhythm: Normal rate.      Pulses: Normal pulses.   Pulmonary:      Effort: Pulmonary effort is " normal. No respiratory distress.      Breath sounds: No stridor. No wheezing or rales.   Abdominal:      General: Bowel sounds are normal. There is no distension.      Palpations: Abdomen is soft.      Tenderness: There is no abdominal tenderness. There is no guarding.   Genitourinary:     Comments: Deferred  Musculoskeletal:      Cervical back: Normal range of motion.      Right lower leg: No edema.      Left lower leg: No edema.   Skin:     General: Skin is warm and dry.      Capillary Refill: Capillary refill takes less than 2 seconds.      Coloration: Skin is not jaundiced or pale.      Findings: No erythema or rash.   Neurological:      General: No focal deficit present.      Mental Status: She is alert and oriented to person, place, and time.   Psychiatric:         Mood and Affect: Mood normal.         Behavior: Behavior normal.       Results Review:   I reviewed the patient's new clinical results.    Lab Results   Component Value Date    WBC 7.64 10/15/2022    HGB 14.2 10/15/2022    HGB 13.9 01/07/2020    HCT 40.0 10/15/2022    MCV 92.2 10/15/2022     10/15/2022       No results found for: INR    Lab Results   Component Value Date    GLUCOSE 104 (H) 10/15/2022    BUN 6 10/15/2022    CREATININE 0.53 (L) 10/15/2022    BCR 11.3 10/15/2022     (L) 10/15/2022    K 2.5 (C) 10/15/2022    CO2 30.0 (H) 10/15/2022    CALCIUM 8.7 10/15/2022    ALBUMIN 3.00 (L) 10/15/2022    ALKPHOS 71 10/15/2022    BILITOT 0.5 10/15/2022    ALT 36 (H) 10/15/2022    AST 49 (H) 10/15/2022       ASSESSMENTS/PLANS    1.  Chronic severe nausea and vomiting.  At risk for SMA syndrome.  At risk for peptic ulcer disease (NSAID use).  2.  Hyponatremic, hypokalemic, metabolic alkalosis consistent with significant vomiting.  3.  Weight loss.  4.  Severe protein calorie malnutrition and underweight.  5.  Borderline elevated transaminases and focal fatty liver on CT.  Suspect related to severe malnutrition.  6.  Seizure disorder.   Patient reports good control of seizures on Keppra since 2004.    >> Plan EGD when electrolytes corrected, tentatively on Monday, 10/17/2022.  >> At risk for refeeding syndrome.  >> Recommend discontinue Topamax and find an alternative for migraine prophylaxis if needed, as Topamax is associated with nausea and weight loss.    I discussed the patient's findings and my recommendations with patient.    Mark I. Brunner, MD  10/15/22  16:00 EDT      Electronically signed by Brunner, Mark I, MD at 10/15/22 4881

## 2022-10-17 NOTE — DISCHARGE SUMMARY
[facility] Medicine Services  DISCHARGE SUMMARY    Patient Name: Lucinda Koch  : 1970  MRN: [id number]    Date of Admission: 10/15/2022  8:55 AM  Date of Discharge:  10/17/22  Primary Care Physician: Jese Tran MD    Consults     Date and Time Order Name Status Description    10/15/2022  1:03 PM Inpatient Gastroenterology Consult Completed           Hospital Course     Presenting Problem:   Intractable vomiting [R11.10]    Active Hospital Problems    Diagnosis  POA   • Seizure disorder (HCC) [G40.909]  Yes   • Severe malnutrition (HCC) [E43]  Unknown      Resolved Hospital Problems    Diagnosis Date Resolved POA   • **Intractable vomiting [R11.10] 10/17/2022 Yes   • Hypokalemia [E87.6] 10/17/2022 Yes   • Hyponatremia [E87.1] 10/17/2022 Yes          Hospital Course:  Lucinda Koch is a 52 y.o. female with hx of depression, anxiety, migraines, and seizures who presents due to 6 month history of progressively worsening nausea with almost daily vomiting. Also notes 40 lb weight loss. Has been seen in multiple ER's without a diagnosis. Admitted for further management.     Intractable nausea and vomiting, resolved  Adult failure to thrive  Severe malnutrition  Neuropathy  --CT A/P unremarkable.  --TSH, folate normal. B12 on the lower end. 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  --GI consulted, Dr. Brunner performed EGD today which only showed mild gastritis. Took biopsy for H.pylori. Recommends daily PPI at discharge.  --Has appointment in [place] for colonoscopy on 10/20/22, she can keep this if she likes or defer until she [place] [facility]   --Nutrition consulted.     Hypokalemia  Hyponatremia, not clinically significant  Metabolic alkalosis  --Due to vomiting, volume depletion.  --Improved with fluids and electrolyte replacement.     Mild transaminitis  --CT scan with focal fatty liver.  --Monitor.  --F/U with GI as outpatient.     Migraines  --Continue  on Topamax. GI recommends considering alternative agent as this medication can cause nausea and weight loss--defer to outpatient/PCP management.     Seizure disorder  --Continue Keppra.     Depression/anxiety  --Continue Prozac.         Discharge Follow Up Recommendations for outpatient labs/diagnostics:  F/U with PCP in 1 week  F/U with [facility] in 2-3 weeks    Day of Discharge     HPI:   Patient seen this morning after EGD. Tolerating diet. Wants to go home.    Review of Systems  Gen-no fevers, no chills  CV-no chest pain, no palpitations  Resp-no cough, no dyspnea  GI-no N/V/D, no abd pain    All other systems reviewed and negative except any additional pertinent positives and negatives as discussed in HPI.      Vital Signs:   Temp:  [98 °F (36.7 °C)-98.8 °F (37.1 °C)] 98.4 °F (36.9 °C)  Heart Rate:  [78-96] 85  Resp:  [16-18] 16  BP: (104-148)/() 148/102      Physical Exam:  Gen-thin female in no acute distress  HENT-NCAT, mucous membranes moist  CV-RRR, S1 S2 normal, no m/r/g  Resp-CTAB, no wheezes or rales  Abd-soft, NT, ND, +BS  Ext-no edema  Neuro-A&Ox3, no focal deficits  Skin-no rashes  Psych-appropriate mood      Pertinent  and/or Most Recent Results     LAB RESULTS:      Lab 10/17/22  0544 10/16/22  0640 10/15/22  1000   WBC 5.57 7.63 7.64   HEMOGLOBIN 11.6* 12.1 14.2   HEMATOCRIT 35.4 34.7 40.0   PLATELETS 232 268 273   NEUTROS ABS  --  4.44 5.49   IMMATURE GRANS (ABS)  --  0.01 0.04   LYMPHS ABS  --  2.56 1.51   MONOS ABS  --  0.54 0.57   EOS ABS  --  0.06 0.02   .4* 93.3 92.2   LACTATE  --   --  1.8         Lab 10/17/22  0544 10/16/22  1213 10/16/22  0640 10/16/22  0102 10/15/22  1038   SODIUM 138  --  133*  --  133*   POTASSIUM 4.9 4.6 5.0 3.4* 2.5*   CHLORIDE 107  --  102  --  91*   CO2 24.0  --  25.0  --  30.0*   ANION GAP 7.0  --  6.0  --  12.0   BUN 2*  --  4*  --  6   CREATININE 0.42*  --  0.47*  --  0.53*   EGFR 118.6  --  115.4  --  112.1   GLUCOSE 95  --  108*  --  104*   CALCIUM  8.3*  --  7.8*  --  8.7   MAGNESIUM  --   --  2.4  --  1.3*   TSH  --   --   --   --  1.570         Lab 10/17/22  0544 10/16/22  0640 10/15/22  1038   TOTAL PROTEIN 4.4* 4.8* 5.1*   ALBUMIN 2.70* 2.90* 3.00*   GLOBULIN  --  1.9 2.1   ALT (SGPT) 66* 45* 36*   AST (SGOT) 77* 89* 49*   BILIRUBIN 0.3 0.4 0.5   BILIRUBIN DIRECT <0.2  --   --    ALK PHOS 72 74 71   LIPASE  --   --  14                 Lab 10/15/22  1630   FOLATE 16.60   VITAMIN B 12 335         Brief Urine Lab Results  (Last result in the past 365 days)      Color   Clarity   Blood   Leuk Est   Nitrite   Protein   CREAT   Urine HCG        10/15/22 1000 Yellow   Cloudy   Negative   Negative   Negative   Negative               Microbiology Results (last 10 days)     ** No results found for the last 240 hours. **          CT Abdomen Pelvis With Contrast    Result Date: 10/15/2022  DATE OF EXAM: 10/15/2022 10:17 AM  PROCEDURE: CT ABDOMEN PELVIS W CONTRAST-  INDICATIONS: cyclical vomiting  COMPARISON: No comparisons available.  TECHNIQUE: Routine transaxial slices were obtained through the abdomen and pelvis after the intravenous administration of 100 mL of Isovue 300. Reconstructed coronal and sagittal images were also obtained. Automated exposure control and iterative construction methods were used.  The radiation dose reduction device was turned on for each scan per the ALARA (As Low as Reasonably Achievable) protocol.  FINDINGS: The lung bases are clear.  There is focal steatosis within the left hepatic lobe near the falciform ligament. The gallbladder, adrenal glands, right kidney, spleen, and pancreas are unremarkable. There is left renal cortical scarring.  The stomach appears normal. The small bowel appears normal in caliber and configuration. The colon appears normal. The appendix is not well-visualized. There is no ascites or loculated collection. No abnormally enlarged lymph nodes are identified.  The rectum and urinary bladder are unremarkable. The  uterus is surgically absent.  No aggressive osseous lesions are identified.      No acute process identified within abdomen/pelvis.  This report was finalized on 10/15/2022 10:59 AM by Donny Pozo MD.          Pending Labs     Order Current Status    Tissue Pathology Exam In process        Discharge Details        Discharge Medications      New Medications      Instructions Start Date   pantoprazole 40 MG EC tablet  Commonly known as: PROTONIX   40 mg, Oral, Every Early Morning   Start Date: October 18, 2022        Continue These Medications      Instructions Start Date   FLUoxetine 10 MG capsule  Commonly known as: PROzac   10 mg, Oral, Daily      hydrOXYzine pamoate 25 MG capsule  Commonly known as: VISTARIL   take 1 capsule by mouth every 6 hours if needed for anxiety      levETIRAcetam 750 MG tablet  Commonly known as: KEPPRA   750 mg, Oral, 2 Times Daily      topiramate 100 MG tablet  Commonly known as: TOPAMAX   100 mg, Oral, 2 Times Daily         Stop These Medications    sertraline 100 MG tablet  Commonly known as: ZOLOFT            Allergies   Allergen Reactions   • Aspirin          Discharge Disposition:  Home or Self Care    Diet:  Hospital:  Diet Order   Procedures   • Diet Regular       Activity:  Activity Instructions     Activity as Tolerated            CODE STATUS:    Code Status and Medical Interventions:   Ordered at: 10/15/22 1221     Code Status (Patient has no pulse and is not breathing):    CPR (Attempt to Resuscitate)     Medical Interventions (Patient has pulse or is breathing):    Full Support       No future appointments.    Additional Instructions for the Follow-ups that You Need to Schedule     Discharge Follow-up with PCP   As directed       Currently Documented PCP:    Jese Tran MD    PCP Phone Number:    [phone]     Follow Up Details: 1 week         Discharge Follow-up with Specified Provider: [facility] in 2-3 weeks with repeat CMP   As directed      To: [facility]  in 2-3 weeks with repeat CMP                     Shannan Graves MD  10/17/22      Time Spent on Discharge:  I spent 33 minutes on this discharge activity which included: face-to-face encounter with the patient, reviewing the data in the system, coordination of the care with the nursing staff as well as consultants, documentation, and entering orders.

## 2022-10-19 LAB
CYTO UR: NORMAL
LAB AP CASE REPORT: NORMAL
LAB AP CLINICAL INFORMATION: NORMAL
LAB AP SPECIAL STAINS: NORMAL
PATH REPORT.FINAL DX SPEC: NORMAL
PATH REPORT.GROSS SPEC: NORMAL

## 2022-10-30 NOTE — PROGRESS NOTES
"Enter Query Response Below      Query Response: Unable to determine             If applicable, please update the problem list.     Patient: Lucinda Koch        : 1970  Account: [id number]           Admit Date: 10/15/2022        How to Respond to this query:       a. Click New Note     b. Answer query within the yellow box.                c. Update the Problem List, if applicable.      If you have any questions about this query contact me at: [email or web address]    Dr. Graves    52 year old female  with history of nausea/vomiting/weight loss x 6 months admitted 10/15/22 with intractable nausea and vomiting and Severe malnutrition per the discharge summary. 10/16/22 Registered dietician note \"unable to eat normally for last 6 mo w 40 lb wt loss.\", \"Would be 8 lb loss 9% of body wt ? over what time frame\", There is noted \"mild Subcutaneous fat\" and \"moderate Muscle\",  \"BMI: BMI (Calculated): 14.6, Underweight:<18.5kg/m2\". Recommended increase PO intake.     After study, was severe malnutrition clinically supported during this admission?  Yes, severe malnutrition was supported with additional clinical indicators:____________  No, severe malnutrition was not supported, underweight only  Other__________________  Unable to determine    ASPEN criteria for severe malnutrition requires the presence of at least two of the following: insufficient energy intake (<50% of est. energy requirement for 5 days), weight loss (>2%/week, >5% month, >7.5%/ 3 months), severe loss of muscle mass, severe loss of subcutaneous fat, localized or generalized fluid accumulation, diminished functional status (measured by calibrated hand  strength).    By submitting this query, we are merely seeking further clarification of documentation to accurately reflect all conditions that you are monitoring, evaluating, treating or that extend the hospitalization or utilize additional resources of care. Please utilize your independent " clinical judgment when addressing the question(s) above.     This query and your response, once completed, will be entered into the legal medical record.    Sincerely,  Colleen Reynoso  Clinical Documentation Integrity Program

## 2022-11-09 ENCOUNTER — LAB (OUTPATIENT)
Dept: LAB | Facility: HOSPITAL | Age: 52
End: 2022-11-09

## 2022-11-09 ENCOUNTER — OFFICE VISIT (OUTPATIENT)
Dept: GASTROENTEROLOGY | Facility: CLINIC | Age: 52
End: 2022-11-09

## 2022-11-09 VITALS
HEART RATE: 132 BPM | BODY MASS INDEX: 13.87 KG/M2 | TEMPERATURE: 98.2 F | HEIGHT: 62 IN | SYSTOLIC BLOOD PRESSURE: 126 MMHG | WEIGHT: 75.4 LBS | OXYGEN SATURATION: 99 % | DIASTOLIC BLOOD PRESSURE: 72 MMHG

## 2022-11-09 DIAGNOSIS — R11.2 NAUSEA AND VOMITING, UNSPECIFIED VOMITING TYPE: ICD-10-CM

## 2022-11-09 DIAGNOSIS — K21.9 GASTROESOPHAGEAL REFLUX DISEASE, UNSPECIFIED WHETHER ESOPHAGITIS PRESENT: Primary | ICD-10-CM

## 2022-11-09 DIAGNOSIS — R63.4 WEIGHT LOSS: ICD-10-CM

## 2022-11-09 DIAGNOSIS — Z72.0 TOBACCO ABUSE: ICD-10-CM

## 2022-11-09 DIAGNOSIS — K21.9 GASTROESOPHAGEAL REFLUX DISEASE, UNSPECIFIED WHETHER ESOPHAGITIS PRESENT: ICD-10-CM

## 2022-11-09 LAB
25(OH)D3 SERPL-MCNC: 82.6 NG/ML (ref 30–100)
ALBUMIN SERPL-MCNC: 3.3 G/DL (ref 3.5–5.2)
ALBUMIN/GLOB SERPL: 1.2 G/DL
ALP SERPL-CCNC: 160 U/L (ref 39–117)
ALT SERPL W P-5'-P-CCNC: 65 U/L (ref 1–33)
ANION GAP SERPL CALCULATED.3IONS-SCNC: 12.8 MMOL/L (ref 5–15)
AST SERPL-CCNC: 89 U/L (ref 1–32)
BASOPHILS # BLD AUTO: 0.05 10*3/MM3 (ref 0–0.2)
BASOPHILS NFR BLD AUTO: 0.7 % (ref 0–1.5)
BILIRUB SERPL-MCNC: 0.7 MG/DL (ref 0–1.2)
BUN SERPL-MCNC: 3 MG/DL (ref 6–20)
BUN/CREAT SERPL: 4.3 (ref 7–25)
CALCIUM SPEC-SCNC: 10.1 MG/DL (ref 8.6–10.5)
CHLORIDE SERPL-SCNC: 100 MMOL/L (ref 98–107)
CO2 SERPL-SCNC: 26.2 MMOL/L (ref 22–29)
CREAT SERPL-MCNC: 0.7 MG/DL (ref 0.57–1)
CRP SERPL-MCNC: <0.3 MG/DL (ref 0–0.5)
DEPRECATED RDW RBC AUTO: 41.4 FL (ref 37–54)
EGFRCR SERPLBLD CKD-EPI 2021: 104.9 ML/MIN/1.73
EOSINOPHIL # BLD AUTO: 0.06 10*3/MM3 (ref 0–0.4)
EOSINOPHIL NFR BLD AUTO: 0.8 % (ref 0.3–6.2)
ERYTHROCYTE [DISTWIDTH] IN BLOOD BY AUTOMATED COUNT: 12.4 % (ref 12.3–15.4)
GLOBULIN UR ELPH-MCNC: 2.8 GM/DL
GLUCOSE SERPL-MCNC: 117 MG/DL (ref 65–99)
HBA1C MFR BLD: 4.8 % (ref 4.8–5.6)
HCT VFR BLD AUTO: 42.1 % (ref 34–46.6)
HGB BLD-MCNC: 14.5 G/DL (ref 12–15.9)
HIV1+2 AB SER QL: NORMAL
IMM GRANULOCYTES # BLD AUTO: 0.02 10*3/MM3 (ref 0–0.05)
IMM GRANULOCYTES NFR BLD AUTO: 0.3 % (ref 0–0.5)
LYMPHOCYTES # BLD AUTO: 2.34 10*3/MM3 (ref 0.7–3.1)
LYMPHOCYTES NFR BLD AUTO: 31.4 % (ref 19.6–45.3)
MCH RBC QN AUTO: 31.7 PG (ref 26.6–33)
MCHC RBC AUTO-ENTMCNC: 34.4 G/DL (ref 31.5–35.7)
MCV RBC AUTO: 91.9 FL (ref 79–97)
MONOCYTES # BLD AUTO: 0.47 10*3/MM3 (ref 0.1–0.9)
MONOCYTES NFR BLD AUTO: 6.3 % (ref 5–12)
NEUTROPHILS NFR BLD AUTO: 4.51 10*3/MM3 (ref 1.7–7)
NEUTROPHILS NFR BLD AUTO: 60.5 % (ref 42.7–76)
NRBC BLD AUTO-RTO: 0 /100 WBC (ref 0–0.2)
PLATELET # BLD AUTO: 331 10*3/MM3 (ref 140–450)
PMV BLD AUTO: 9.9 FL (ref 6–12)
POTASSIUM SERPL-SCNC: 4.2 MMOL/L (ref 3.5–5.2)
PROT SERPL-MCNC: 6.1 G/DL (ref 6–8.5)
RBC # BLD AUTO: 4.58 10*6/MM3 (ref 3.77–5.28)
SODIUM SERPL-SCNC: 139 MMOL/L (ref 136–145)
WBC NRBC COR # BLD: 7.45 10*3/MM3 (ref 3.4–10.8)

## 2022-11-09 PROCEDURE — 86258 DGP ANTIBODY EACH IG CLASS: CPT

## 2022-11-09 PROCEDURE — 82306 VITAMIN D 25 HYDROXY: CPT | Performed by: PHYSICIAN ASSISTANT

## 2022-11-09 PROCEDURE — 36415 COLL VENOUS BLD VENIPUNCTURE: CPT

## 2022-11-09 PROCEDURE — 82784 ASSAY IGA/IGD/IGG/IGM EACH: CPT

## 2022-11-09 PROCEDURE — 99214 OFFICE O/P EST MOD 30 MIN: CPT | Performed by: PHYSICIAN ASSISTANT

## 2022-11-09 PROCEDURE — 85025 COMPLETE CBC W/AUTO DIFF WBC: CPT

## 2022-11-09 PROCEDURE — 86140 C-REACTIVE PROTEIN: CPT

## 2022-11-09 PROCEDURE — 86480 TB TEST CELL IMMUN MEASURE: CPT

## 2022-11-09 PROCEDURE — G0432 EIA HIV-1/HIV-2 SCREEN: HCPCS

## 2022-11-09 PROCEDURE — 83036 HEMOGLOBIN GLYCOSYLATED A1C: CPT

## 2022-11-09 PROCEDURE — 86364 TISS TRNSGLTMNASE EA IG CLAS: CPT

## 2022-11-09 PROCEDURE — 80053 COMPREHEN METABOLIC PANEL: CPT | Performed by: PHYSICIAN ASSISTANT

## 2022-11-09 PROCEDURE — 86231 EMA EACH IG CLASS: CPT

## 2022-11-09 RX ORDER — DICYCLOMINE HCL 20 MG
TABLET ORAL
COMMUNITY
Start: 2022-09-07 | End: 2022-12-16

## 2022-11-09 RX ORDER — PANTOPRAZOLE SODIUM 20 MG/1
20 TABLET, DELAYED RELEASE ORAL DAILY
Qty: 30 TABLET | Refills: 5 | Status: SHIPPED | OUTPATIENT
Start: 2022-11-09

## 2022-11-09 RX ORDER — PANCRELIPASE 30000; 6000; 19000 [USP'U]/1; [USP'U]/1; [USP'U]/1
CAPSULE, DELAYED RELEASE PELLETS ORAL
COMMUNITY
Start: 2022-11-01 | End: 2022-12-16

## 2022-11-09 NOTE — PROGRESS NOTES
Follow Up      Patient Name: Lucinda Koch  : 1970   MRN: [id number]     Chief Complaint:    Chief Complaint   Patient presents with   • Hospital Follow Up Visit     nausea and vomiting       History of Present Illness: Lucinda Koch is a 52 y.o. female, PMH includes anxiety, depression, migraines and seizures, who is here today for hospital follow up.    Pt was admitted to [facility] 10/15 - 10/17 for evaluation of intractable vomiting. She endorses 40lb weight loss over 6 months. EGD 10/17 with Dr. Brunner reveals normal esophagus, mild antral gastritis and normal duodenum. Bx negative for H Pylori, intestinal metaplasia or dysplasia. She is down another 5lb since discharge.     Pt has had good relief of nausea and vomiting with addition of pantoprazole 40mg daily. She has not had any episodes of emesis since discharge. She does c/o generalized bloating and increased flatus, and loose stools postprandially. She was started on creon via PCP one week ago, and notes no change in sx. She notes a general disinterest in food and eats about two small meals per day. She is not currently consuming any high protein / meal supplementation.     Patient denies associated fever, chills, abdominal pain, indigestion, constipation, hematemesis, dysphagia, hematochezia, melena, dysuria, jaundice or bruising.    Patient denies personal or FHx of PUD, H Pylori, pancreatitis, colitis, Celiac disease, UC, Crohn's disease, IBS, colon or gastric cancers. Pt denies EtOH, illicit substance or NSAID use. Smokes 0.5ppd.     CSY with Dr. Brown in [place] 10/20/22. No acute findings.     Subjective      Review of Systems:   Review of Systems   Constitutional: Positive for appetite change (decreased) and unexpected weight loss. Negative for chills, diaphoresis, fatigue, fever and unexpected weight gain.   HENT: Negative for drooling, facial swelling, mouth sores, rhinorrhea, sore throat, tinnitus, trouble swallowing and voice  change.    Eyes: Negative.    Respiratory: Negative for cough, chest tightness and shortness of breath.    Cardiovascular: Negative for chest pain.   Gastrointestinal: Positive for abdominal distention (bloating) and diarrhea (loose stools). Negative for abdominal pain, blood in stool, constipation, nausea, vomiting, GERD and indigestion.        (+) flatus   Genitourinary: Negative for dysuria, flank pain, hematuria and pelvic pain.   Musculoskeletal: Negative for arthralgias and myalgias.   Skin: Negative for color change, pallor and rash.   Neurological: Negative for dizziness, tremors, syncope, weakness and numbness.   Psychiatric/Behavioral: Negative for hallucinations and sleep disturbance. The patient is not nervous/anxious.    All other systems reviewed and are negative.      Medications:     Current Outpatient Medications:   •  Creon 6000-63920 units capsule delayed-release particles capsule, TAKE 1 CAPSULE BY MOUTH THREE TIMES DAILY FOR DIGESTION, Disp: , Rfl:   •  dicyclomine (BENTYL) 20 MG tablet, TAKE 1 TABLET BY MOUTH FOUR TIMES DAILY FOR IRRITABLE BOWEL SYMPTOMS, Disp: , Rfl:   •  FLUoxetine (PROzac) 10 MG capsule, Take 1 capsule by mouth Daily., Disp: , Rfl:   •  levETIRAcetam (KEPPRA) 750 MG tablet, Take 1 tablet by mouth 2 (Two) Times a Day., Disp: , Rfl:   •  pantoprazole (PROTONIX) 40 MG EC tablet, Take 1 tablet by mouth Every Morning., Disp: 30 tablet, Rfl: 0    Allergies:   Allergies   Allergen Reactions   • Aspirin        Social History:   Social History     Socioeconomic History   • Marital status: Single   Tobacco Use   • Smoking status: Every Day     Packs/day: 2.00     Types: Cigarettes   • Smokeless tobacco: Never   Vaping Use   • Vaping Use: Never used   Substance and Sexual Activity   • Alcohol use: No   • Drug use: Defer   • Sexual activity: Defer        Surgical History:   Past Surgical History:   Procedure Laterality Date   • ENDOSCOPY N/A 10/17/2022    Procedure:  "ESOPHAGOGASTRODUODENOSCOPY;  Surgeon: Brunner, Mark I, MD;  Location: [facility] ENDOSCOPY;  Service: Gastroenterology;  Laterality: N/A;        Medical History:   Past Medical History:   Diagnosis Date   • Depression    • Migraine    • Seizures (HCC)         Objective     Physical Exam:  Vital Signs:   Vitals:    11/09/22 1017   BP: 126/72   BP Location: Left arm   Patient Position: Sitting   Cuff Size: Adult   Pulse: (!) 132   Temp: 98.2 °F (36.8 °C)   TempSrc: Temporal   SpO2: 99%   Weight: 34.2 kg (75 lb 6.4 oz)   Height: 157.5 cm (62.01\")     Body mass index is 13.79 kg/m².     Physical Exam  Vitals and nursing note reviewed.   Constitutional:       Appearance: Normal appearance. She is normal weight. She is ill-appearing (chronically). She is not diaphoretic.      Comments: Appears older than stated age   HENT:      Head: Normocephalic and atraumatic.      Right Ear: External ear normal.      Left Ear: External ear normal.      Nose: Nose normal. No rhinorrhea.      Mouth/Throat:      Mouth: Mucous membranes are moist.      Pharynx: Oropharynx is clear. No posterior oropharyngeal erythema.   Eyes:      General:         Right eye: No discharge.         Left eye: No discharge.      Conjunctiva/sclera: Conjunctivae normal.      Pupils: Pupils are equal, round, and reactive to light.   Neck:      Thyroid: No thyromegaly.   Cardiovascular:      Rate and Rhythm: Normal rate and regular rhythm.      Pulses: Normal pulses.      Heart sounds: Normal heart sounds. No murmur heard.  Pulmonary:      Effort: Pulmonary effort is normal.      Breath sounds: Normal breath sounds. No wheezing.   Abdominal:      General: Abdomen is flat. Bowel sounds are normal. There is no distension.      Tenderness: There is no abdominal tenderness.   Musculoskeletal:         General: No tenderness. Normal range of motion.      Cervical back: Normal range of motion and neck supple.      Right lower leg: No edema.      Left lower leg: No edema. "   Skin:     General: Skin is warm and dry.      Capillary Refill: Capillary refill takes less than 2 seconds.      Coloration: Skin is not jaundiced.      Findings: No bruising.   Neurological:      General: No focal deficit present.      Mental Status: She is oriented to person, place, and time.      Cranial Nerves: No cranial nerve deficit.   Psychiatric:         Mood and Affect: Mood normal.         Thought Content: Thought content normal.         Judgment: Judgment normal.         Assessment / Plan      Assessment/Plan:   There are no diagnoses linked to this encounter.     GERD  Nausea and vomiting, resolved  Weight loss  Tobacco abuse   - continue pantoprazole, decrease dose to 20mg daily   - discontinue creon   - consider trial of remeron, pending workup below   - pt given GERD diet instructions, advised to avoid GI irritants such as caffeine, carbonation, EtOH, tobacco, chocolate, peppermint, acid-based foods.   - pt encouraged to focus on higher protein intake including 2-3 Boost / Ensure per day   - obtain CSY records from [place]   - previous hospital records, endoscopy and pathology reviewed   - obtain CT Chest w/ contrast due to tobacco abuse, weight loss   - obtain CBC, CMP, CRP, TB, HIV, A1c, vit D, celiac panel   - follow up in clinic in 4 weeks, or after completion of above studies   - call clinic at any time for questions or new / worsened sx    I advised the patient of the risks in continuing to use tobacco, and I provided this patient with smoking cessation educational materials.  I also discussed how to quit smoking and the patient has expressed the willingness to quit.      During this visit, I spent less than 3 minutes counseling the patient regarding smoking cessation.     Follow Up:   Return in about 4 weeks (around 12/7/2022).    Plan of care reviewed with the patient at the conclusion of today's visit.  Education was provided regarding diagnosis, management, and any prescribed or  recommended OTC medications.  Patient verbalized understanding of and agreement with management plan.     NOTE TO PATIENT: The 21st Century Cures Act makes medical notes like these available to patients in the interest of transparency. However, be advised this is a medical document. It is intended as peer to peer communication. It is written in medical language and may contain abbreviations or verbiage that are unfamiliar. It may appear blunt or direct. Medical documents are intended to carry relevant information, facts as evident, and the clinical opinion of the practitioner.     Time Statement:   Discussed plan of care in detail with patient today. Patient verbally understands and agrees. I have spent 30 minutes reviewing available diagnostics, obtaining history, examining the patient, developing a treatment plan, and educating the patient on disease process and plan of care.     Jessica Jarrell PA-C   [facility] [place] [facility]

## 2022-11-10 DIAGNOSIS — R74.8 ELEVATED ALKALINE PHOSPHATASE LEVEL: Primary | ICD-10-CM

## 2022-11-10 LAB
ENDOMYSIUM IGA SER QL: NEGATIVE
GLIADIN PEPTIDE IGA SER-ACNC: 5 UNITS (ref 0–19)
GLIADIN PEPTIDE IGG SER-ACNC: 2 UNITS (ref 0–19)
IGA SERPL-MCNC: 297 MG/DL (ref 87–352)
TTG IGA SER-ACNC: <2 U/ML (ref 0–3)
TTG IGG SER-ACNC: <2 U/ML (ref 0–5)

## 2022-11-10 NOTE — PROGRESS NOTES
Please let Lucinda know that her labs reveal the following:  - HIV negative  - CBC, CRP, A1c, vit D within normal limits  - CMP reveals persistently elevated liver enzymes, and new elevation of alkaline phosphatase. I have ordered some additional labs for her to please complete at her earliest convenience.    Thanks!

## 2022-11-12 LAB
GAMMA INTERFERON BACKGROUND BLD IA-ACNC: 0.01 IU/ML
M TB IFN-G BLD-IMP: NEGATIVE
M TB IFN-G CD4+ BCKGRND COR BLD-ACNC: 0.02 IU/ML
M TB IFN-G CD4+CD8+ BCKGRND COR BLD-ACNC: 0.02 IU/ML
MITOGEN IGNF BLD-ACNC: >10 IU/ML
QUANTIFERON INCUBATION: NORMAL
SERVICE CMNT-IMP: NORMAL

## 2022-11-22 DIAGNOSIS — R63.4 WEIGHT LOSS: Primary | ICD-10-CM

## 2022-11-22 RX ORDER — MEGESTROL ACETATE 20 MG/1
20 TABLET ORAL DAILY
Qty: 30 TABLET | Refills: 0 | Status: SHIPPED | OUTPATIENT
Start: 2022-11-22 | End: 2022-12-16 | Stop reason: SDUPTHER

## 2022-12-16 ENCOUNTER — OFFICE VISIT (OUTPATIENT)
Dept: GASTROENTEROLOGY | Facility: CLINIC | Age: 52
End: 2022-12-16

## 2022-12-16 ENCOUNTER — LAB (OUTPATIENT)
Dept: LAB | Facility: HOSPITAL | Age: 52
End: 2022-12-16

## 2022-12-16 VITALS
HEIGHT: 62 IN | OXYGEN SATURATION: 98 % | SYSTOLIC BLOOD PRESSURE: 126 MMHG | DIASTOLIC BLOOD PRESSURE: 70 MMHG | HEART RATE: 104 BPM | WEIGHT: 78.4 LBS | TEMPERATURE: 98.2 F | BODY MASS INDEX: 14.43 KG/M2

## 2022-12-16 DIAGNOSIS — Z72.0 TOBACCO ABUSE: ICD-10-CM

## 2022-12-16 DIAGNOSIS — R63.4 WEIGHT LOSS: ICD-10-CM

## 2022-12-16 DIAGNOSIS — R74.8 ELEVATED ALKALINE PHOSPHATASE LEVEL: ICD-10-CM

## 2022-12-16 DIAGNOSIS — K21.9 GASTROESOPHAGEAL REFLUX DISEASE, UNSPECIFIED WHETHER ESOPHAGITIS PRESENT: Primary | ICD-10-CM

## 2022-12-16 LAB — GGT SERPL-CCNC: 37 U/L (ref 5–36)

## 2022-12-16 PROCEDURE — 82977 ASSAY OF GGT: CPT

## 2022-12-16 PROCEDURE — 86038 ANTINUCLEAR ANTIBODIES: CPT

## 2022-12-16 PROCEDURE — 99214 OFFICE O/P EST MOD 30 MIN: CPT | Performed by: PHYSICIAN ASSISTANT

## 2022-12-16 RX ORDER — MEGESTROL ACETATE 40 MG/1
20 TABLET ORAL 2 TIMES DAILY
Qty: 60 TABLET | Refills: 1 | Status: SHIPPED | OUTPATIENT
Start: 2022-12-16

## 2022-12-16 NOTE — PROGRESS NOTES
Follow Up      Patient Name: Lucinda Koch  : 1970   MRN: [id number]     Chief Complaint:  No chief complaint on file.      History of Present Illness: Lucinda Koch is a 52 y.o. female who is here today for follow up on GERD, elevated alkaline phosphatase, weight loss.     Pt states that overall, she is doing well. She has decreased to smoking only 6 cigarettes per day. She notes some improvement of appetite with megace. She is generally eating three protein rich meals per day, eating about half of her plate each time. She is drinking about 3 Ensure drinks per day and has gained 3lb since last visit. She has returned to work at the [employer] and notes that her energy is better.     Since last visit, labs significant for total bili 0.7, AST 89, ALT 65, alk phos 160. HIV negative, celiac panel negative. CBC / CRP / A1c / vitamin D within normal limits. CT Chest scheduled 23. 4h GES scheduled for 23.     Patient denies associated fever, chills, abdominal pain, indigestion, nausea, vomiting, diarrhea, constipation, hematemesis, dysphagia, hematochezia, melena, bloating, dysuria, jaundice or bruising.    EGD 10/17 with Dr. Brunner reveals normal esophagus, mild antral gastritis and normal duodenum. Bx negative for H Pylori, intestinal metaplasia or dysplasia. She is down another 5lb since discharge.     Subjective      Review of Systems:   Review of Systems   Constitutional: Positive for appetite change (decreased). Negative for chills, diaphoresis, fatigue, fever, unexpected weight gain and unexpected weight loss.   HENT: Negative for drooling, facial swelling, mouth sores, rhinorrhea, sore throat, tinnitus, trouble swallowing and voice change.    Eyes: Negative.    Respiratory: Negative for cough, chest tightness and shortness of breath.    Cardiovascular: Negative for chest pain.   Gastrointestinal: Negative for abdominal pain, blood in stool, constipation, diarrhea, nausea, vomiting,  GERD and indigestion.   Genitourinary: Negative for dysuria, flank pain, hematuria and pelvic pain.   Musculoskeletal: Negative for arthralgias and myalgias.   Skin: Negative for color change, pallor and rash.   Neurological: Negative for dizziness, tremors, syncope, weakness and numbness.   Psychiatric/Behavioral: Negative for hallucinations and sleep disturbance. The patient is not nervous/anxious.    All other systems reviewed and are negative.      Medications:     Current Outpatient Medications:   •  Creon 6000-38664 units capsule delayed-release particles capsule, TAKE 1 CAPSULE BY MOUTH THREE TIMES DAILY FOR DIGESTION, Disp: , Rfl:   •  dicyclomine (BENTYL) 20 MG tablet, TAKE 1 TABLET BY MOUTH FOUR TIMES DAILY FOR IRRITABLE BOWEL SYMPTOMS, Disp: , Rfl:   •  FLUoxetine (PROzac) 10 MG capsule, Take 1 capsule by mouth Daily., Disp: , Rfl:   •  levETIRAcetam (KEPPRA) 750 MG tablet, Take 1 tablet by mouth 2 (Two) Times a Day., Disp: , Rfl:   •  megestrol (MEGACE) 20 MG tablet, Take 1 tablet by mouth Daily., Disp: 30 tablet, Rfl: 0  •  pantoprazole (Protonix) 20 MG EC tablet, Take 1 tablet by mouth Daily., Disp: 30 tablet, Rfl: 5    Allergies:   Allergies   Allergen Reactions   • Aspirin        Social History:   Social History     Socioeconomic History   • Marital status: Single   Tobacco Use   • Smoking status: Every Day     Packs/day: 2.00     Types: Cigarettes   • Smokeless tobacco: Never   Vaping Use   • Vaping Use: Never used   Substance and Sexual Activity   • Alcohol use: No   • Drug use: Defer   • Sexual activity: Defer        Surgical History:   Past Surgical History:   Procedure Laterality Date   • ENDOSCOPY N/A 10/17/2022    Procedure: ESOPHAGOGASTRODUODENOSCOPY;  Surgeon: Brunner, Mark I, MD;  [facility]: [facility] ENDOSCOPY;  Service: Gastroenterology;  Laterality: N/A;        Medical History:   Past Medical History:   Diagnosis Date   • Depression    • Migraine    • Seizures (HCC)         Objective      Physical Exam:  Vital Signs: There were no vitals filed for this visit.  There is no height or weight on file to calculate BMI.     Physical Exam  Vitals and nursing note reviewed.   Constitutional:       Appearance: Normal appearance. She is underweight. She is not ill-appearing or diaphoretic.      Comments: Pleasantly conversant. Petite adult female.    HENT:      Head: Normocephalic and atraumatic.      Right Ear: External ear normal.      Left Ear: External ear normal.      Nose: Nose normal. No rhinorrhea.      Mouth/Throat:      Mouth: Mucous membranes are moist.      Pharynx: Oropharynx is clear. No posterior oropharyngeal erythema.   Eyes:      General:         Right eye: No discharge.         Left eye: No discharge.      Conjunctiva/sclera: Conjunctivae normal.      Pupils: Pupils are equal, round, and reactive to light.   Neck:      Thyroid: No thyromegaly.   Cardiovascular:      Rate and Rhythm: Normal rate and regular rhythm.      Pulses: Normal pulses.      Heart sounds: Normal heart sounds. No murmur heard.  Pulmonary:      Effort: Pulmonary effort is normal.      Breath sounds: Normal breath sounds. No wheezing.   Abdominal:      General: Abdomen is flat. Bowel sounds are normal. There is no distension.      Tenderness: There is no abdominal tenderness.   Musculoskeletal:         General: No tenderness. Normal range of motion.      Cervical back: Normal range of motion and neck supple.      Right lower leg: No edema.      Left lower leg: No edema.   Skin:     General: Skin is warm and dry.      Capillary Refill: Capillary refill takes less than 2 seconds.      Coloration: Skin is not jaundiced.      Findings: No bruising.   Neurological:      General: No focal deficit present.      Mental Status: She is oriented to person, place, and time.      Cranial Nerves: No cranial nerve deficit.   Psychiatric:         Mood and Affect: Mood normal.         Thought Content: Thought content normal.          Judgment: Judgment normal.         Assessment / Plan      Assessment/Plan:   There are no diagnoses linked to this encounter.     GERD  Elevated alkaline phosphatase  Weight loss, stable  Tobacco abuse   - continue pantoprazole 20mg daily   - pt given GERD diet instructions, advised to avoid GI irritants such as caffeine, carbonation, EtOH, tobacco, chocolate, peppermint, acid-based foods   - pt encouraged to continue to focus on adequate protein intake   - pt commended on efforts to cut back on tobacco use   - obtain IESHA, anti SMA, mitochondrial Ab, alk phos isoenzymes, GGT as previously ordered   - obtain CT Chest, 4h GES as scheduled 1/23/23   - follow up in clinic in 6 weeks, or after completion of above studies   - call clinic at any time for questions or new / worsened sx    I advised the patient of the risks in continuing to use tobacco, and I provided this patient with smoking cessation educational materials.  I also discussed how to quit smoking and the patient has expressed the willingness to quit.      During this visit, I spent less than 3 minutes counseling the patient regarding smoking cessation.     Follow Up:   Return in about 6 weeks (around 1/27/2023).    Plan of care reviewed with the patient at the conclusion of today's visit.  Education was provided regarding diagnosis, management, and any prescribed or recommended OTC medications.  Patient verbalized understanding of and agreement with management plan.     NOTE TO PATIENT: The 21st Century Cures Act makes medical notes like these available to patients in the interest of transparency. However, be advised this is a medical document. It is intended as peer to peer communication. It is written in medical language and may contain abbreviations or verbiage that are unfamiliar. It may appear blunt or direct. Medical documents are intended to carry relevant information, facts as evident, and the clinical opinion of the practitioner.     Time Statement:    Discussed plan of care in detail with patient today. Patient verbally understands and agrees. I have spent 30 minutes reviewing available diagnostics, obtaining history, examining the patient, developing a treatment plan, and educating the patient on disease process and plan of care.     Jessica Jarrell PA-C   [facility]

## 2022-12-19 LAB — ANA SER QL: NEGATIVE

## 2023-02-06 ENCOUNTER — OFFICE VISIT (OUTPATIENT)
Dept: GASTROENTEROLOGY | Facility: CLINIC | Age: 53
End: 2023-02-06
Payer: COMMERCIAL

## 2023-02-06 ENCOUNTER — LAB (OUTPATIENT)
Dept: LAB | Facility: HOSPITAL | Age: 53
End: 2023-02-06
Payer: COMMERCIAL

## 2023-02-06 VITALS
TEMPERATURE: 96.4 F | SYSTOLIC BLOOD PRESSURE: 120 MMHG | DIASTOLIC BLOOD PRESSURE: 76 MMHG | HEART RATE: 84 BPM | BODY MASS INDEX: 14.48 KG/M2 | WEIGHT: 79.2 LBS | OXYGEN SATURATION: 99 %

## 2023-02-06 DIAGNOSIS — R63.4 WEIGHT LOSS: ICD-10-CM

## 2023-02-06 DIAGNOSIS — R63.4 WEIGHT LOSS: Primary | ICD-10-CM

## 2023-02-06 DIAGNOSIS — R74.8 ELEVATED ALKALINE PHOSPHATASE LEVEL: ICD-10-CM

## 2023-02-06 DIAGNOSIS — Z72.0 TOBACCO ABUSE: ICD-10-CM

## 2023-02-06 LAB
ALBUMIN SERPL-MCNC: 4.4 G/DL (ref 3.5–5.2)
ALBUMIN/GLOB SERPL: 1.8 G/DL
ALP SERPL-CCNC: 99 U/L (ref 39–117)
ALT SERPL W P-5'-P-CCNC: 7 U/L (ref 1–33)
ANION GAP SERPL CALCULATED.3IONS-SCNC: 8.3 MMOL/L (ref 5–15)
AST SERPL-CCNC: 14 U/L (ref 1–32)
BASOPHILS # BLD AUTO: 0.02 10*3/MM3 (ref 0–0.2)
BASOPHILS NFR BLD AUTO: 0.2 % (ref 0–1.5)
BILIRUB SERPL-MCNC: 0.4 MG/DL (ref 0–1.2)
BUN SERPL-MCNC: 8 MG/DL (ref 6–20)
BUN/CREAT SERPL: 11.4 (ref 7–25)
CALCIUM SPEC-SCNC: 9.8 MG/DL (ref 8.6–10.5)
CHLORIDE SERPL-SCNC: 104 MMOL/L (ref 98–107)
CO2 SERPL-SCNC: 27.7 MMOL/L (ref 22–29)
CREAT SERPL-MCNC: 0.7 MG/DL (ref 0.57–1)
DEPRECATED RDW RBC AUTO: 40.9 FL (ref 37–54)
EGFRCR SERPLBLD CKD-EPI 2021: 104.2 ML/MIN/1.73
EOSINOPHIL # BLD AUTO: 0.03 10*3/MM3 (ref 0–0.4)
EOSINOPHIL NFR BLD AUTO: 0.4 % (ref 0.3–6.2)
ERYTHROCYTE [DISTWIDTH] IN BLOOD BY AUTOMATED COUNT: 11.9 % (ref 12.3–15.4)
GGT SERPL-CCNC: 18 U/L (ref 5–36)
GLOBULIN UR ELPH-MCNC: 2.5 GM/DL
GLUCOSE SERPL-MCNC: 83 MG/DL (ref 65–99)
HCT VFR BLD AUTO: 41.4 % (ref 34–46.6)
HGB BLD-MCNC: 13.4 G/DL (ref 12–15.9)
IMM GRANULOCYTES # BLD AUTO: 0.02 10*3/MM3 (ref 0–0.05)
IMM GRANULOCYTES NFR BLD AUTO: 0.2 % (ref 0–0.5)
LYMPHOCYTES # BLD AUTO: 3.28 10*3/MM3 (ref 0.7–3.1)
LYMPHOCYTES NFR BLD AUTO: 38.4 % (ref 19.6–45.3)
MCH RBC QN AUTO: 29.8 PG (ref 26.6–33)
MCHC RBC AUTO-ENTMCNC: 32.4 G/DL (ref 31.5–35.7)
MCV RBC AUTO: 92 FL (ref 79–97)
MONOCYTES # BLD AUTO: 0.51 10*3/MM3 (ref 0.1–0.9)
MONOCYTES NFR BLD AUTO: 6 % (ref 5–12)
NEUTROPHILS NFR BLD AUTO: 4.69 10*3/MM3 (ref 1.7–7)
NEUTROPHILS NFR BLD AUTO: 54.8 % (ref 42.7–76)
NRBC BLD AUTO-RTO: 0 /100 WBC (ref 0–0.2)
PLATELET # BLD AUTO: 294 10*3/MM3 (ref 140–450)
PMV BLD AUTO: 9.9 FL (ref 6–12)
POTASSIUM SERPL-SCNC: 4.7 MMOL/L (ref 3.5–5.2)
PROT SERPL-MCNC: 6.9 G/DL (ref 6–8.5)
RBC # BLD AUTO: 4.5 10*6/MM3 (ref 3.77–5.28)
SODIUM SERPL-SCNC: 140 MMOL/L (ref 136–145)
WBC NRBC COR # BLD: 8.55 10*3/MM3 (ref 3.4–10.8)

## 2023-02-06 PROCEDURE — 85025 COMPLETE CBC W/AUTO DIFF WBC: CPT

## 2023-02-06 PROCEDURE — 80053 COMPREHEN METABOLIC PANEL: CPT | Performed by: PHYSICIAN ASSISTANT

## 2023-02-06 PROCEDURE — 86381 MITOCHONDRIAL ANTIBODY EACH: CPT

## 2023-02-06 PROCEDURE — 36415 COLL VENOUS BLD VENIPUNCTURE: CPT

## 2023-02-06 PROCEDURE — 86015 ACTIN ANTIBODY EACH: CPT

## 2023-02-06 PROCEDURE — 84075 ASSAY ALKALINE PHOSPHATASE: CPT

## 2023-02-06 PROCEDURE — 82977 ASSAY OF GGT: CPT | Performed by: PHYSICIAN ASSISTANT

## 2023-02-06 PROCEDURE — 84080 ASSAY ALKALINE PHOSPHATASES: CPT

## 2023-02-06 PROCEDURE — 99214 OFFICE O/P EST MOD 30 MIN: CPT | Performed by: PHYSICIAN ASSISTANT

## 2023-02-06 NOTE — PROGRESS NOTES
Follow Up      Patient Name: Lucinda Koch  : 1970   MRN: [id number]     Chief Complaint:    Chief Complaint   Patient presents with   • Follow-up     No symptoms. Supposed to have CT test done in  but rescheduled for April- insurance issues.        History of Present Illness: Lucinda Koch is a 53 y.o. female who is here today for follow up on weight loss.     CT Chest, GES were not obtained due to lack of insurance approval. Pt still has not obtained labs as previously ordered (mitochondrial Ab, anti smooth muscle Ab, fractionated alkaline phosphatase). Weight has been stable since last visit. Pt continues to do well with megace 20mg BID. She notes improvement of appetite, energy, etc. She continues to smoke 5 cigarettes per day.     Patient denies associated fever, chills, abdominal pain, indigestion, nausea, vomiting, diarrhea, constipation, hematemesis, dysphagia, hematochezia, melena, bloating, weight loss or gain, dysuria, jaundice or bruising.    EGD 10/17 with Dr. Brunner reveals normal esophagus, mild antral gastritis and normal duodenum. Bx negative for H Pylori, intestinal metaplasia or dysplasia. She is down another 5lb since discharge.     CSY with Dr. Brown in [place] 10/20/22. No acute findings.     Subjective      Review of Systems:   Review of Systems   Constitutional: Negative for appetite change, chills, diaphoresis, fatigue, fever, unexpected weight gain and unexpected weight loss.   HENT: Negative for drooling, facial swelling, mouth sores, rhinorrhea, sore throat, tinnitus, trouble swallowing and voice change.    Eyes: Negative.    Respiratory: Negative for cough, chest tightness and shortness of breath.    Cardiovascular: Negative for chest pain.   Gastrointestinal: Negative for abdominal pain, blood in stool, constipation, diarrhea, nausea, vomiting, GERD and indigestion.   Genitourinary: Negative for dysuria, flank pain, hematuria and pelvic pain.   Musculoskeletal:  Negative for arthralgias and myalgias.   Skin: Negative for color change, pallor and rash.   Neurological: Negative for dizziness, tremors, syncope, weakness and numbness.   Psychiatric/Behavioral: Negative for hallucinations and sleep disturbance. The patient is not nervous/anxious.    All other systems reviewed and are negative.      Medications:     Current Outpatient Medications:   •  FLUoxetine (PROzac) 10 MG capsule, Take 1 capsule by mouth Daily., Disp: , Rfl:   •  levETIRAcetam (KEPPRA) 750 MG tablet, Take 1 tablet by mouth 2 (Two) Times a Day., Disp: , Rfl:   •  megestrol (MEGACE) 40 MG tablet, Take 0.5 tablets by mouth 2 (Two) Times a Day., Disp: 60 tablet, Rfl: 1  •  pantoprazole (Protonix) 20 MG EC tablet, Take 1 tablet by mouth Daily., Disp: 30 tablet, Rfl: 5    Allergies:   Allergies   Allergen Reactions   • Aspirin        Social History:   Social History     Socioeconomic History   • Marital status: Single   Tobacco Use   • Smoking status: Every Day     Packs/day: 0.50     Years: 15.00     Pack years: 7.50     Types: Cigarettes     Start date: 1/4/2000   • Smokeless tobacco: Never   Vaping Use   • Vaping Use: Never used   Substance and Sexual Activity   • Alcohol use: No   • Drug use: Never   • Sexual activity: Not Currently     Partners: Male     Birth control/protection: None        Surgical History:   Past Surgical History:   Procedure Laterality Date   • ABDOMINAL SURGERY  2004   • COLONOSCOPY     • ENDOSCOPY N/A 10/17/2022    Procedure: ESOPHAGOGASTRODUODENOSCOPY;  Surgeon: Brunner, Mark I, MD;  Location: [facility] END[facility]COPY;  Service: Gastroenterology;  Laterality: N/A;   • HYSTERECTOMY  2004   • UPPER GASTROINTESTINAL ENDOSCOPY  2022        Medical History:   Past Medical History:   Diagnosis Date   • Colon polyp    • Depression    • Fatty liver    • Migraine    • Seizures (HCC)         Objective     Physical Exam:  Vital Signs: There were no vitals filed for this visit.  There is no height or  weight on file to calculate BMI.     Physical Exam  Vitals and nursing note reviewed.   Constitutional:       Appearance: Normal appearance. She is normal weight. She is not ill-appearing or diaphoretic.      Comments: Petite adult female. Pleasantly conversant.    HENT:      Head: Normocephalic and atraumatic.      Right Ear: External ear normal.      Left Ear: External ear normal.      Nose: Nose normal. No rhinorrhea.      Mouth/Throat:      Mouth: Mucous membranes are moist.      Pharynx: Oropharynx is clear. No posterior oropharyngeal erythema.   Eyes:      General:         Right eye: No discharge.         Left eye: No discharge.      Conjunctiva/sclera: Conjunctivae normal.      Pupils: Pupils are equal, round, and reactive to light.   Neck:      Thyroid: No thyromegaly.   Cardiovascular:      Rate and Rhythm: Normal rate and regular rhythm.      Pulses: Normal pulses.      Heart sounds: Normal heart sounds. No murmur heard.  Pulmonary:      Effort: Pulmonary effort is normal.      Breath sounds: Normal breath sounds. No wheezing.   Abdominal:      General: Abdomen is flat. Bowel sounds are normal. There is no distension.      Tenderness: There is no abdominal tenderness.   Musculoskeletal:         General: Normal range of motion.      Cervical back: Normal range of motion.   Skin:     General: Skin is warm and dry.      Coloration: Skin is not jaundiced.      Findings: No bruising.   Neurological:      General: No focal deficit present.      Mental Status: She is oriented to person, place, and time.   Psychiatric:         Mood and Affect: Mood normal.         Thought Content: Thought content normal.         Judgment: Judgment normal.         Assessment / Plan      Assessment/Plan:   There are no diagnoses linked to this encounter.     Weight loss, stable  Elevated alkaline phosphatase  Tobacco abuse   - continue medications as prescribed   - continue to focus on adequate protein intake, as previously  discussed   - previous labs, imaging, endoscopy and pathology reports reviewed   - obtain CBC, CMP, fractionated alk phos, GGT, anti smooth muscle Ab, mitochondrial Ab as previously ordered   - obtain CT Chest, GES as previously ordered   - follow up in clinic after completion of above studies   - call clinic at any time for questions or new / worsened sx    I advised the patient of the risks in continuing to use tobacco, and I provided this patient with smoking cessation educational materials.  I also discussed how to quit smoking and the patient has expressed the willingness to quit.      During this visit, I spent less than 3 minutes counseling the patient regarding smoking cessation.     Follow Up:   Return for Next scheduled follow up.    Plan of care reviewed with the patient at the conclusion of today's visit.  Education was provided regarding diagnosis, management, and any prescribed or recommended OTC medications.  Patient verbalized understanding of and agreement with management plan.     NOTE TO PATIENT: The 21st Century Cures Act makes medical notes like these available to patients in the interest of transparency. However, be advised this is a medical document. It is intended as peer to peer communication. It is written in medical language and may contain abbreviations or verbiage that are unfamiliar. It may appear blunt or direct. Medical documents are intended to carry relevant information, facts as evident, and the clinical opinion of the practitioner.     Time Statement:   Discussed plan of care in detail with patient today. Patient verbally understands and agrees. I have spent 30 minutes reviewing available diagnostics, obtaining history, examining the patient, developing a treatment plan, and educating the patient on disease process and plan of care.     Jessica Jarrell PA-C   [facility]

## 2023-02-07 LAB
ALP BONE CFR SERPL: 34 % (ref 14–68)
ALP INTEST CFR SERPL: 0 % (ref 0–18)
ALP LIVER CFR SERPL: 66 % (ref 18–85)
ALP SERPL-CCNC: 109 IU/L (ref 44–121)
MITOCHONDRIA M2 IGG SER-ACNC: <20 UNITS (ref 0–20)
SMA IGG SER-ACNC: 13 UNITS (ref 0–19)

## 2023-02-08 ENCOUNTER — TELEPHONE (OUTPATIENT)
Dept: GASTROENTEROLOGY | Facility: CLINIC | Age: 53
End: 2023-02-08
Payer: COMMERCIAL

## 2023-02-08 NOTE — PROGRESS NOTES
Please let Lucinda know that her CBC, CMP are entirely normal. Her previously elevated LFTs have normalized. The rest of her autoimmune liver disease workup is also negative.     Thanks!  Jessica Jarrell PA-C

## 2023-02-08 NOTE — TELEPHONE ENCOUNTER
Arnav Lopez called from [facility]. CT scan denied. Insurance is requesting an appeal or order can be cancelled out.

## (undated) DEVICE — CONTN GRAD MEAS TRIANG 32OZ BLK

## (undated) DEVICE — THE BITE BLOCK MAXI, LATEX FREE STRAP IS USED TO PROTECT THE ENDOSCOPE INSERTION TUBE FROM BEING BITTEN BY THE PATIENT.

## (undated) DEVICE — HYBRID CO2 TUBING/CAP SET FOR OLYMPUS® SCOPES & CO2 SOURCE: Brand: ERBE

## (undated) DEVICE — LUBE GEL ENDOGLIDE 1.1OZ

## (undated) DEVICE — TUBING, SUCTION, 1/4" X 10', STRAIGHT: Brand: MEDLINE

## (undated) DEVICE — SOL IRR H2O BTL 1000ML STRL

## (undated) DEVICE — INTRO ACCSR BLNT TP

## (undated) DEVICE — KT ORCA ORCAPOD DISP STRL

## (undated) DEVICE — SYR LUERLOK 50ML

## (undated) DEVICE — SPNG ENDO BEDSIDE TUB ENZYM